# Patient Record
Sex: FEMALE | Race: WHITE | NOT HISPANIC OR LATINO | Employment: FULL TIME | URBAN - METROPOLITAN AREA
[De-identification: names, ages, dates, MRNs, and addresses within clinical notes are randomized per-mention and may not be internally consistent; named-entity substitution may affect disease eponyms.]

---

## 2017-04-05 RX ORDER — FOLIC ACID 1 MG/1
1 TABLET ORAL DAILY
COMMUNITY
End: 2021-12-14

## 2017-04-05 RX ORDER — BUSPIRONE HYDROCHLORIDE 15 MG/1
15 TABLET ORAL 3 TIMES DAILY
COMMUNITY
End: 2021-12-14

## 2017-04-05 RX ORDER — OMEPRAZOLE 20 MG/1
20 CAPSULE, DELAYED RELEASE ORAL DAILY
COMMUNITY
End: 2021-12-14

## 2017-04-05 RX ORDER — VILAZODONE HYDROCHLORIDE 20 MG/1
40 TABLET ORAL
COMMUNITY
End: 2021-12-14

## 2017-04-05 RX ORDER — DIPHENOXYLATE HYDROCHLORIDE AND ATROPINE SULFATE 2.5; .025 MG/1; MG/1
1 TABLET ORAL DAILY
COMMUNITY
End: 2021-12-14

## 2017-04-10 ENCOUNTER — ANESTHESIA (OUTPATIENT)
Dept: PERIOP | Facility: HOSPITAL | Age: 42
End: 2017-04-10
Payer: COMMERCIAL

## 2017-04-10 ENCOUNTER — HOSPITAL ENCOUNTER (OUTPATIENT)
Facility: HOSPITAL | Age: 42
Setting detail: OUTPATIENT SURGERY
Discharge: HOME | End: 2017-04-10
Attending: OTOLARYNGOLOGY | Admitting: OTOLARYNGOLOGY
Payer: COMMERCIAL

## 2017-04-10 ENCOUNTER — ANESTHESIA EVENT (OUTPATIENT)
Dept: PERIOP | Facility: HOSPITAL | Age: 42
End: 2017-04-10
Payer: COMMERCIAL

## 2017-04-10 VITALS
SYSTOLIC BLOOD PRESSURE: 137 MMHG | TEMPERATURE: 97.3 F | DIASTOLIC BLOOD PRESSURE: 68 MMHG | RESPIRATION RATE: 18 BRPM | WEIGHT: 180 LBS | HEIGHT: 64 IN | HEART RATE: 76 BPM | OXYGEN SATURATION: 99 % | BODY MASS INDEX: 30.73 KG/M2

## 2017-04-10 LAB — EXT PREGNANCY TEST URINE: NEGATIVE

## 2017-04-10 PROCEDURE — 81025 URINE PREGNANCY TEST: CPT | Performed by: ANESTHESIOLOGY

## 2017-04-10 RX ORDER — PROPOFOL 10 MG/ML
INJECTION, EMULSION INTRAVENOUS AS NEEDED
Status: DISCONTINUED | OUTPATIENT
Start: 2017-04-10 | End: 2017-04-10 | Stop reason: SURG

## 2017-04-10 RX ORDER — LIDOCAINE HYDROCHLORIDE 10 MG/ML
INJECTION, SOLUTION INFILTRATION; PERINEURAL AS NEEDED
Status: DISCONTINUED | OUTPATIENT
Start: 2017-04-10 | End: 2017-04-10 | Stop reason: SURG

## 2017-04-10 RX ORDER — MIDAZOLAM HYDROCHLORIDE 1 MG/ML
INJECTION INTRAMUSCULAR; INTRAVENOUS AS NEEDED
Status: DISCONTINUED | OUTPATIENT
Start: 2017-04-10 | End: 2017-04-10 | Stop reason: SURG

## 2017-04-10 RX ORDER — GLYCOPYRROLATE 0.2 MG/ML
INJECTION INTRAMUSCULAR; INTRAVENOUS AS NEEDED
Status: DISCONTINUED | OUTPATIENT
Start: 2017-04-10 | End: 2017-04-10 | Stop reason: SURG

## 2017-04-10 RX ORDER — FENTANYL CITRATE 50 UG/ML
INJECTION, SOLUTION INTRAMUSCULAR; INTRAVENOUS AS NEEDED
Status: DISCONTINUED | OUTPATIENT
Start: 2017-04-10 | End: 2017-04-10 | Stop reason: SURG

## 2017-04-10 RX ORDER — DIPHENHYDRAMINE HYDROCHLORIDE 50 MG/ML
INJECTION INTRAMUSCULAR; INTRAVENOUS AS NEEDED
Status: DISCONTINUED | OUTPATIENT
Start: 2017-04-10 | End: 2017-04-10 | Stop reason: SURG

## 2017-04-10 RX ORDER — DEXAMETHASONE SODIUM PHOSPHATE 10 MG/ML
INJECTION, SOLUTION INTRAMUSCULAR; INTRAVENOUS AS NEEDED
Status: DISCONTINUED | OUTPATIENT
Start: 2017-04-10 | End: 2017-04-10 | Stop reason: HOSPADM

## 2017-04-10 RX ORDER — FENTANYL CITRATE/PF 50 MCG/ML
25 SYRINGE (ML) INJECTION
Status: DISCONTINUED | OUTPATIENT
Start: 2017-04-10 | End: 2017-04-10 | Stop reason: HOSPADM

## 2017-04-10 RX ORDER — SODIUM CHLORIDE, SODIUM LACTATE, POTASSIUM CHLORIDE, CALCIUM CHLORIDE 600; 310; 30; 20 MG/100ML; MG/100ML; MG/100ML; MG/100ML
INJECTION, SOLUTION INTRAVENOUS CONTINUOUS PRN
Status: DISCONTINUED | OUTPATIENT
Start: 2017-04-10 | End: 2017-04-10 | Stop reason: SURG

## 2017-04-10 RX ORDER — ONDANSETRON 2 MG/ML
4 INJECTION INTRAMUSCULAR; INTRAVENOUS ONCE
Status: DISCONTINUED | OUTPATIENT
Start: 2017-04-10 | End: 2017-04-10 | Stop reason: HOSPADM

## 2017-04-10 RX ORDER — SUCCINYLCHOLINE CHLORIDE 20 MG/ML
INJECTION INTRAMUSCULAR; INTRAVENOUS AS NEEDED
Status: DISCONTINUED | OUTPATIENT
Start: 2017-04-10 | End: 2017-04-10 | Stop reason: SURG

## 2017-04-10 RX ORDER — SODIUM CHLORIDE, SODIUM LACTATE, POTASSIUM CHLORIDE, CALCIUM CHLORIDE 600; 310; 30; 20 MG/100ML; MG/100ML; MG/100ML; MG/100ML
125 INJECTION, SOLUTION INTRAVENOUS CONTINUOUS
Status: CANCELLED | OUTPATIENT
Start: 2017-04-10

## 2017-04-10 RX ORDER — ROCURONIUM BROMIDE 10 MG/ML
INJECTION, SOLUTION INTRAVENOUS AS NEEDED
Status: DISCONTINUED | OUTPATIENT
Start: 2017-04-10 | End: 2017-04-10 | Stop reason: SURG

## 2017-04-10 RX ORDER — ONDANSETRON 2 MG/ML
INJECTION INTRAMUSCULAR; INTRAVENOUS AS NEEDED
Status: DISCONTINUED | OUTPATIENT
Start: 2017-04-10 | End: 2017-04-10 | Stop reason: SURG

## 2017-04-10 RX ADMIN — REMIFENTANIL HYDROCHLORIDE 0.2 MCG/KG/MIN: 1 INJECTION, POWDER, LYOPHILIZED, FOR SOLUTION INTRAVENOUS at 12:48

## 2017-04-10 RX ADMIN — ONDANSETRON 4 MG: 2 INJECTION INTRAMUSCULAR; INTRAVENOUS at 12:43

## 2017-04-10 RX ADMIN — GLYCOPYRROLATE 0.8 MG: 0.2 INJECTION INTRAMUSCULAR; INTRAVENOUS at 13:20

## 2017-04-10 RX ADMIN — SODIUM CHLORIDE, SODIUM LACTATE, POTASSIUM CHLORIDE, AND CALCIUM CHLORIDE: .6; .31; .03; .02 INJECTION, SOLUTION INTRAVENOUS at 11:04

## 2017-04-10 RX ADMIN — PROPOFOL 200 MG: 10 INJECTION, EMULSION INTRAVENOUS at 12:47

## 2017-04-10 RX ADMIN — LIDOCAINE HYDROCHLORIDE 50 MG: 10 INJECTION, SOLUTION INFILTRATION; PERINEURAL at 12:47

## 2017-04-10 RX ADMIN — NEOSTIGMINE METHYLSULFATE 5 MG: 1 INJECTION INTRAMUSCULAR; INTRAVENOUS; SUBCUTANEOUS at 13:20

## 2017-04-10 RX ADMIN — FENTANYL CITRATE 100 MCG: 50 INJECTION INTRAMUSCULAR; INTRAVENOUS at 12:47

## 2017-04-10 RX ADMIN — MIDAZOLAM HYDROCHLORIDE 2 MG: 1 INJECTION, SOLUTION INTRAMUSCULAR; INTRAVENOUS at 12:43

## 2017-04-10 RX ADMIN — SUCCINYLCHOLINE CHLORIDE 100 MG: 20 INJECTION, SOLUTION INTRAMUSCULAR; INTRAVENOUS at 12:47

## 2017-04-10 RX ADMIN — DIPHENHYDRAMINE HYDROCHLORIDE 12.5 MG: 50 INJECTION, SOLUTION INTRAMUSCULAR; INTRAVENOUS at 12:44

## 2017-04-10 RX ADMIN — ROCURONIUM BROMIDE 20 MG: 10 INJECTION, SOLUTION INTRAVENOUS at 12:58

## 2018-10-01 PROBLEM — Z00.00 ENCOUNTER FOR PREVENTIVE HEALTH EXAMINATION: Status: ACTIVE | Noted: 2018-10-01

## 2018-10-04 ENCOUNTER — OUTPATIENT (OUTPATIENT)
Dept: OUTPATIENT SERVICES | Facility: HOSPITAL | Age: 43
LOS: 1 days | End: 2018-10-04
Payer: COMMERCIAL

## 2018-10-04 ENCOUNTER — APPOINTMENT (OUTPATIENT)
Dept: RADIOLOGY | Facility: HOSPITAL | Age: 43
End: 2018-10-04
Payer: COMMERCIAL

## 2018-10-04 PROCEDURE — 74740 X-RAY FEMALE GENITAL TRACT: CPT

## 2018-10-04 PROCEDURE — 58340 CATHETER FOR HYSTEROGRAPHY: CPT

## 2018-10-04 PROCEDURE — 74740 X-RAY FEMALE GENITAL TRACT: CPT | Mod: 26

## 2018-10-06 ENCOUNTER — OUTPATIENT (OUTPATIENT)
Dept: OUTPATIENT SERVICES | Facility: HOSPITAL | Age: 43
LOS: 1 days | End: 2018-10-06
Payer: COMMERCIAL

## 2018-10-06 ENCOUNTER — APPOINTMENT (OUTPATIENT)
Dept: ULTRASOUND IMAGING | Facility: HOSPITAL | Age: 43
End: 2018-10-06

## 2018-10-06 PROCEDURE — 76830 TRANSVAGINAL US NON-OB: CPT

## 2018-10-06 PROCEDURE — 76830 TRANSVAGINAL US NON-OB: CPT | Mod: 26

## 2018-10-07 ENCOUNTER — OUTPATIENT (OUTPATIENT)
Dept: OUTPATIENT SERVICES | Facility: HOSPITAL | Age: 43
LOS: 1 days | End: 2018-10-07
Payer: COMMERCIAL

## 2018-10-07 PROCEDURE — 76830 TRANSVAGINAL US NON-OB: CPT | Mod: 26

## 2018-10-07 PROCEDURE — 76830 TRANSVAGINAL US NON-OB: CPT

## 2019-05-25 ENCOUNTER — APPOINTMENT (OUTPATIENT)
Dept: LAB | Facility: HOSPITAL | Age: 44
End: 2019-05-25
Payer: COMMERCIAL

## 2019-05-25 ENCOUNTER — TRANSCRIBE ORDERS (OUTPATIENT)
Dept: ADMINISTRATIVE | Facility: HOSPITAL | Age: 44
End: 2019-05-25

## 2019-05-25 DIAGNOSIS — N88.3 FEMALE INFERTILITY DUE TO STRUCTURAL CONGENITAL ANOMALY OF CERVIX: ICD-10-CM

## 2019-05-25 DIAGNOSIS — N97.8 FEMALE INFERTILITY DUE TO STRUCTURAL CONGENITAL ANOMALY OF CERVIX: Primary | ICD-10-CM

## 2019-05-25 DIAGNOSIS — Z31.41 FERTILITY TESTING: ICD-10-CM

## 2019-05-25 DIAGNOSIS — N88.3 FEMALE INFERTILITY DUE TO STRUCTURAL CONGENITAL ANOMALY OF CERVIX: Primary | ICD-10-CM

## 2019-05-25 DIAGNOSIS — N97.8 FEMALE INFERTILITY DUE TO STRUCTURAL CONGENITAL ANOMALY OF CERVIX: ICD-10-CM

## 2019-05-25 LAB — PROGEST SERPL-MCNC: 6.8 NG/ML

## 2019-05-25 PROCEDURE — 36415 COLL VENOUS BLD VENIPUNCTURE: CPT

## 2019-05-25 PROCEDURE — 84144 ASSAY OF PROGESTERONE: CPT

## 2019-06-20 ENCOUNTER — TRANSCRIBE ORDERS (OUTPATIENT)
Dept: ADMINISTRATIVE | Facility: HOSPITAL | Age: 44
End: 2019-06-20

## 2019-06-20 ENCOUNTER — APPOINTMENT (OUTPATIENT)
Dept: LAB | Facility: HOSPITAL | Age: 44
End: 2019-06-20
Payer: COMMERCIAL

## 2019-06-20 DIAGNOSIS — N97.9 FEMALE INFERTILITY: Primary | ICD-10-CM

## 2019-06-20 DIAGNOSIS — N97.9 FEMALE INFERTILITY: ICD-10-CM

## 2019-06-20 LAB — PROGEST SERPL-MCNC: 5.1 NG/ML

## 2019-06-20 PROCEDURE — 84144 ASSAY OF PROGESTERONE: CPT

## 2019-06-20 PROCEDURE — 36415 COLL VENOUS BLD VENIPUNCTURE: CPT

## 2021-12-14 ENCOUNTER — INITIAL PRENATAL (OUTPATIENT)
Dept: OBGYN CLINIC | Facility: CLINIC | Age: 46
End: 2021-12-14
Payer: COMMERCIAL

## 2021-12-14 VITALS
BODY MASS INDEX: 31.58 KG/M2 | WEIGHT: 185 LBS | SYSTOLIC BLOOD PRESSURE: 120 MMHG | DIASTOLIC BLOOD PRESSURE: 82 MMHG | HEIGHT: 64 IN

## 2021-12-14 DIAGNOSIS — Z34.01 ENCOUNTER FOR SUPERVISION OF NORMAL FIRST PREGNANCY IN FIRST TRIMESTER: Primary | ICD-10-CM

## 2021-12-14 DIAGNOSIS — E03.8 SUBCLINICAL HYPOTHYROIDISM: ICD-10-CM

## 2021-12-14 DIAGNOSIS — Z23 NEED FOR INFLUENZA VACCINATION: ICD-10-CM

## 2021-12-14 PROBLEM — I10 ESSENTIAL HYPERTENSION: Status: ACTIVE | Noted: 2021-08-27

## 2021-12-14 PROBLEM — Z31.69 PROCREATIVE MANAGEMENT COUNSELING: Status: ACTIVE | Noted: 2021-08-26

## 2021-12-14 PROCEDURE — PNV: Performed by: PHYSICIAN ASSISTANT

## 2021-12-14 PROCEDURE — G0476 HPV COMBO ASSAY CA SCREEN: HCPCS | Performed by: PHYSICIAN ASSISTANT

## 2021-12-14 PROCEDURE — 90471 IMMUNIZATION ADMIN: CPT

## 2021-12-14 PROCEDURE — G0145 SCR C/V CYTO,THINLAYER,RESCR: HCPCS | Performed by: PHYSICIAN ASSISTANT

## 2021-12-14 PROCEDURE — 90686 IIV4 VACC NO PRSV 0.5 ML IM: CPT

## 2021-12-14 RX ORDER — CHORIOGONADOTROPIN ALFA 250 UG/.5ML
250 INJECTION, SOLUTION SUBCUTANEOUS
COMMUNITY
End: 2021-12-14

## 2021-12-14 RX ORDER — LEVOTHYROXINE SODIUM 0.03 MG/1
TABLET ORAL
COMMUNITY
Start: 2021-11-30 | End: 2022-02-17 | Stop reason: SDUPTHER

## 2021-12-14 RX ORDER — VALACYCLOVIR HYDROCHLORIDE 500 MG/1
TABLET, FILM COATED ORAL
Status: ON HOLD | COMMUNITY
Start: 2021-11-13 | End: 2022-03-31 | Stop reason: ALTCHOICE

## 2021-12-16 LAB
HPV HR 12 DNA CVX QL NAA+PROBE: NEGATIVE
HPV16 DNA CVX QL NAA+PROBE: NEGATIVE
HPV18 DNA CVX QL NAA+PROBE: NEGATIVE

## 2021-12-21 LAB
LAB AP GYN PRIMARY INTERPRETATION: NORMAL
LAB AP LMP: NORMAL
Lab: NORMAL

## 2021-12-23 ENCOUNTER — TELEPHONE (OUTPATIENT)
Dept: OBGYN CLINIC | Facility: CLINIC | Age: 46
End: 2021-12-23

## 2021-12-28 LAB — GLUCOSE 1H P 50 G GLC PO SERPL-MCNC: 83 MG/DL

## 2021-12-29 ENCOUNTER — TELEPHONE (OUTPATIENT)
Dept: OTHER | Facility: OTHER | Age: 46
End: 2021-12-29

## 2021-12-29 LAB
ABO GROUP BLD: NORMAL
ALBUMIN SERPL BCP-MCNC: 3.9 G/DL (ref 3.5–5)
ALP SERPL-CCNC: 65 U/L (ref 46–116)
ALT SERPL W P-5'-P-CCNC: 31 U/L (ref 12–78)
ANION GAP SERPL CALCULATED.3IONS-SCNC: 11 MMOL/L (ref 4–13)
AST SERPL W P-5'-P-CCNC: 22 U/L (ref 5–45)
B-HCG SERPL-ACNC: ABNORMAL MIU/ML
BASOPHILS # BLD AUTO: 0.05 THOUSANDS/ΜL (ref 0–0.1)
BASOPHILS NFR BLD AUTO: 1 % (ref 0–1)
BILIRUB SERPL-MCNC: 0.28 MG/DL (ref 0.2–1)
BLD GP AB SCN SERPL QL: NEGATIVE
BUN SERPL-MCNC: 11 MG/DL (ref 5–25)
CALCIUM SERPL-MCNC: 10.2 MG/DL (ref 8.3–10.1)
CHLORIDE SERPL-SCNC: 101 MMOL/L (ref 100–108)
CO2 SERPL-SCNC: 24 MMOL/L (ref 21–32)
CREAT SERPL-MCNC: 0.89 MG/DL (ref 0.6–1.3)
EOSINOPHIL # BLD AUTO: 0.09 THOUSAND/ΜL (ref 0–0.61)
EOSINOPHIL NFR BLD AUTO: 1 % (ref 0–6)
ERYTHROCYTE [DISTWIDTH] IN BLOOD BY AUTOMATED COUNT: 13.2 % (ref 11.6–15.1)
GFR SERPL CREATININE-BSD FRML MDRD: 79 ML/MIN/1.73SQ M
GLUCOSE SERPL-MCNC: 94 MG/DL (ref 65–140)
HCT VFR BLD AUTO: 42.5 % (ref 34.8–46.1)
HGB BLD-MCNC: 14.9 G/DL (ref 11.5–15.4)
IMM GRANULOCYTES # BLD AUTO: 0.04 THOUSAND/UL (ref 0–0.2)
IMM GRANULOCYTES NFR BLD AUTO: 0 % (ref 0–2)
LYMPHOCYTES # BLD AUTO: 2.86 THOUSANDS/ΜL (ref 0.6–4.47)
LYMPHOCYTES NFR BLD AUTO: 28 % (ref 14–44)
MCH RBC QN AUTO: 29.7 PG (ref 26.8–34.3)
MCHC RBC AUTO-ENTMCNC: 35.1 G/DL (ref 31.4–37.4)
MCV RBC AUTO: 85 FL (ref 82–98)
MONOCYTES # BLD AUTO: 1.03 THOUSAND/ΜL (ref 0.17–1.22)
MONOCYTES NFR BLD AUTO: 10 % (ref 4–12)
NEUTROPHILS # BLD AUTO: 6.02 THOUSANDS/ΜL (ref 1.85–7.62)
NEUTS SEG NFR BLD AUTO: 60 % (ref 43–75)
NRBC BLD AUTO-RTO: 0 /100 WBCS
PLATELET # BLD AUTO: 225 THOUSANDS/UL (ref 149–390)
PMV BLD AUTO: 10 FL (ref 8.9–12.7)
POTASSIUM SERPL-SCNC: 3.6 MMOL/L (ref 3.5–5.3)
PROT SERPL-MCNC: 7.5 G/DL (ref 6.4–8.2)
RBC # BLD AUTO: 5.02 MILLION/UL (ref 3.81–5.12)
RH BLD: POSITIVE
SODIUM SERPL-SCNC: 136 MMOL/L (ref 136–145)
SPECIMEN EXPIRATION DATE: NORMAL
WBC # BLD AUTO: 10.09 THOUSAND/UL (ref 4.31–10.16)

## 2021-12-29 PROCEDURE — 99284 EMERGENCY DEPT VISIT MOD MDM: CPT | Performed by: EMERGENCY MEDICINE

## 2021-12-29 PROCEDURE — 80053 COMPREHEN METABOLIC PANEL: CPT | Performed by: EMERGENCY MEDICINE

## 2021-12-29 PROCEDURE — 85025 COMPLETE CBC W/AUTO DIFF WBC: CPT | Performed by: EMERGENCY MEDICINE

## 2021-12-29 PROCEDURE — 86850 RBC ANTIBODY SCREEN: CPT | Performed by: EMERGENCY MEDICINE

## 2021-12-29 PROCEDURE — 86900 BLOOD TYPING SEROLOGIC ABO: CPT | Performed by: EMERGENCY MEDICINE

## 2021-12-29 PROCEDURE — 99284 EMERGENCY DEPT VISIT MOD MDM: CPT

## 2021-12-29 PROCEDURE — 86901 BLOOD TYPING SEROLOGIC RH(D): CPT | Performed by: EMERGENCY MEDICINE

## 2021-12-29 PROCEDURE — 36415 COLL VENOUS BLD VENIPUNCTURE: CPT | Performed by: EMERGENCY MEDICINE

## 2021-12-29 PROCEDURE — 84702 CHORIONIC GONADOTROPIN TEST: CPT | Performed by: EMERGENCY MEDICINE

## 2021-12-29 RX ORDER — ONDANSETRON 4 MG/1
4 TABLET, ORALLY DISINTEGRATING ORAL ONCE
Status: COMPLETED | OUTPATIENT
Start: 2021-12-29 | End: 2021-12-29

## 2021-12-29 RX ADMIN — ONDANSETRON 4 MG: 4 TABLET, ORALLY DISINTEGRATING ORAL at 19:07

## 2021-12-29 NOTE — ED PROVIDER NOTES
History  No chief complaint on file  HPI     42year old female presenting to the ED with vaginal bleeding since 30 minutes ago  Patient was walking around a store, felt a gush, noted that there was a lot of bleeding  Had spotting last Thursday  Lasted one day then self resolved  C/o dizziness, hand numbness, nausea  Reports an episode of heartburn earlier this morning which has since resolved  (-) abdominal pain, no history of easy bleeding  No blood thinners, no antiplatelet medications  PMH HTN, GERD  Cannot display prior to admission medications because the patient has not been admitted in this contact  Past Medical History:   Diagnosis Date    Abnormal Pap smear of cervix     in 30's   Anxiety     Environmental allergies     GERD (gastroesophageal reflux disease)     Hypertension     PONV (postoperative nausea and vomiting)     Sinus infection     chronic       Past Surgical History:   Procedure Laterality Date    HAND SURGERY Left     ganglion cyst    [place] LARYNGOSCOPY,DIRECT,SCOPE,INJ CORDS N/A 4/10/2017    Procedure: MICROLARYNGOSCOPY BILATERAL VOCAL FOLD INJECTION (DECADRON); Surgeon: Angella Kirk MD;  [facility];  Service: ENT    REFRACTIVE SURGERY  2010    WISDOM TOOTH EXTRACTION         Family History   Problem Relation Age of Onset   Sabina Mare Breast cancer Mother 48    Hypertension Father     Diabetes type I Brother     Diabetes Maternal Grandmother     Diabetes Paternal Grandfather      I have reviewed and agree with the history as documented  E-Cigarette/Vaping    E-Cigarette Use Never User      E-Cigarette/Vaping Substances    Nicotine No     THC No     CBD No     Flavoring No     Other No     Unknown No      Social History     Tobacco Use    Smoking status: Never Smoker    Smokeless tobacco: Never Used   Vaping Use    Vaping Use: Never used   Substance Use Topics    Alcohol use: Not Currently     Comment: socially 1-2 drinks/wk    Drug use:  No Review of Systems   Constitutional: Negative for chills and fever  Respiratory: Negative for apnea, cough, choking, chest tightness, shortness of breath, wheezing and stridor  Cardiovascular: Positive for chest pain (hearburn)  Negative for palpitations and leg swelling  Gastrointestinal: Positive for nausea  Negative for abdominal distention, abdominal pain, constipation, diarrhea, rectal pain and vomiting  Genitourinary: Positive for vaginal bleeding  Negative for dysuria, hematuria and vaginal pain  Musculoskeletal: Negative for back pain, gait problem and neck pain  Skin: Negative for color change, pallor, rash and wound  Neurological: Negative for dizziness, tremors, seizures, syncope, facial asymmetry, speech difficulty, weakness, light-headedness, numbness and headaches  Physical Exam  Physical Exam  Vitals and nursing note reviewed  Constitutional:       General: She is in acute distress  Appearance: She is well-developed  She is not ill-appearing, toxic-appearing or diaphoretic  HENT:      Head: Normocephalic and atraumatic  Right Ear: External ear normal       Left Ear: External ear normal       Nose: Nose normal    Eyes:      General:         Right eye: No discharge  Left eye: No discharge  Extraocular Movements: Extraocular movements intact  Conjunctiva/sclera: Conjunctivae normal       Pupils: Pupils are equal, round, and reactive to light  Cardiovascular:      Rate and Rhythm: Normal rate and regular rhythm  Heart sounds: Normal heart sounds  No murmur heard  No friction rub  No gallop  Pulmonary:      Effort: Pulmonary effort is normal  No respiratory distress  Breath sounds: Normal breath sounds  No stridor  No wheezing, rhonchi or rales  Chest:      Chest wall: No tenderness  Abdominal:      General: Bowel sounds are normal  There is no distension  Palpations: Abdomen is soft  There is no mass  Tenderness:  There is no abdominal tenderness  There is no guarding or rebound  Hernia: No hernia is present  Genitourinary:     Comments: Deferred -- am in triage  Musculoskeletal:         General: No tenderness or deformity  Normal range of motion  Cervical back: Normal range of motion and neck supple  Skin:     General: Skin is warm and dry  Capillary Refill: Capillary refill takes less than 2 seconds  Neurological:      Mental Status: She is alert and oriented to person, place, and time  Psychiatric:         Mood and Affect: Mood is anxious  Affect is tearful  Vital Signs  ED Triage Vitals   Temp Pulse Resp BP SpO2   -- -- -- -- --      Temp src Heart Rate Source Patient Position - Orthostatic VS BP Location FiO2 (%)   -- -- -- -- --      Pain Score       --           There were no vitals filed for this visit  Visual Acuity      ED Medications  Medications - No data to display    Diagnostic Studies  Results Reviewed     Procedure Component Value Units Date/Time    CBC and differential [[id number]]     Lab Status: No result Specimen: Blood     Comprehensive metabolic panel [[id number]]     Lab Status: No result Specimen: Blood     hCG, quantitative [[id number]]     Lab Status: No result Specimen: Blood                  No orders to display              Procedures  Procedures         ED Course                                             MDM    Disposition  Final diagnoses:   None     ED Disposition     None      Follow-up Information    None         Patient's Medications   Discharge Prescriptions    No medications on file       No discharge procedures on file      PDMP Review     None          ED Provider  Electronically Signed by           Raiza Roth MD  12/29/21 4862

## 2021-12-30 ENCOUNTER — ULTRASOUND (OUTPATIENT)
Dept: PERINATAL CARE | Facility: OTHER | Age: 46
End: 2021-12-30
Payer: COMMERCIAL

## 2021-12-30 ENCOUNTER — HOSPITAL ENCOUNTER (EMERGENCY)
Facility: HOSPITAL | Age: 46
Discharge: HOME | End: 2021-12-30
Attending: EMERGENCY MEDICINE | Admitting: EMERGENCY MEDICINE
Payer: COMMERCIAL

## 2021-12-30 VITALS
HEART RATE: 101 BPM | SYSTOLIC BLOOD PRESSURE: 106 MMHG | WEIGHT: 179.4 LBS | HEIGHT: 64 IN | DIASTOLIC BLOOD PRESSURE: 77 MMHG | BODY MASS INDEX: 30.63 KG/M2

## 2021-12-30 VITALS
SYSTOLIC BLOOD PRESSURE: 134 MMHG | RESPIRATION RATE: 18 BRPM | TEMPERATURE: 98 F | DIASTOLIC BLOOD PRESSURE: 70 MMHG | HEART RATE: 100 BPM | OXYGEN SATURATION: 100 %

## 2021-12-30 DIAGNOSIS — O41.8X10 SUBCHORIONIC HEMATOMA IN FIRST TRIMESTER, SINGLE OR UNSPECIFIED FETUS: ICD-10-CM

## 2021-12-30 DIAGNOSIS — Z3A.12 12 WEEKS GESTATION OF PREGNANCY: ICD-10-CM

## 2021-12-30 DIAGNOSIS — Z36.82 ENCOUNTER FOR (NT) NUCHAL TRANSLUCENCY SCAN: Primary | ICD-10-CM

## 2021-12-30 DIAGNOSIS — O46.8X1 SUBCHORIONIC HEMATOMA IN FIRST TRIMESTER, SINGLE OR UNSPECIFIED FETUS: ICD-10-CM

## 2021-12-30 DIAGNOSIS — O46.90 VAGINAL BLEEDING IN PREGNANCY: ICD-10-CM

## 2021-12-30 DIAGNOSIS — O20.0 THREATENED MISCARRIAGE: ICD-10-CM

## 2021-12-30 DIAGNOSIS — O46.90 VAGINAL BLEEDING DURING PREGNANCY: Primary | ICD-10-CM

## 2021-12-30 PROBLEM — O10.919 CHRONIC HYPERTENSION AFFECTING PREGNANCY: Status: ACTIVE | Noted: 2021-08-27

## 2021-12-30 PROBLEM — O98.511 COVID-19 AFFECTING PREGNANCY IN FIRST TRIMESTER: Status: ACTIVE | Noted: 2021-12-30

## 2021-12-30 PROBLEM — E03.9 HYPOTHYROIDISM AFFECTING PREGNANCY: Status: ACTIVE | Noted: 2021-12-30

## 2021-12-30 PROBLEM — O99.280 HYPOTHYROIDISM AFFECTING PREGNANCY: Status: ACTIVE | Noted: 2021-12-30

## 2021-12-30 PROBLEM — U07.1 COVID-19 AFFECTING PREGNANCY IN FIRST TRIMESTER: Status: ACTIVE | Noted: 2021-12-30

## 2021-12-30 PROCEDURE — 76813 OB US NUCHAL MEAS 1 GEST: CPT | Performed by: OBSTETRICS & GYNECOLOGY

## 2021-12-30 PROCEDURE — 76801 OB US < 14 WKS SINGLE FETUS: CPT | Performed by: OBSTETRICS & GYNECOLOGY

## 2021-12-30 PROCEDURE — 99242 OFF/OP CONSLTJ NEW/EST SF 20: CPT | Performed by: OBSTETRICS & GYNECOLOGY

## 2021-12-30 PROCEDURE — NC001 PR NO CHARGE: Performed by: STUDENT IN AN ORGANIZED HEALTH CARE EDUCATION/TRAINING PROGRAM

## 2021-12-30 PROCEDURE — NC001 PR NO CHARGE: Performed by: EMERGENCY MEDICINE

## 2021-12-30 NOTE — CONSULTS
Consultation - Gynecology  Lily Reddy 42 y o  female MRN: [id number]  Unit/Bed#: ED 11 Encounter: [id number]      Consults      Chief Complaint   Patient presents with    Vaginal Bleeding - Pregnant     PT is 12 weeks pregnant and was walking around [address] when she started to bleed  PT denies pain  History of Present Illness   Physician Requesting Consult: Micki Del Rio MD  Reason for Consult / Principal Problem Vaginal bleeding in pregnancy  Subspeciality: ObGyn  HPI: Brain Aldrich is a 42 y o   who presents with worsening vaginal bleeding this evening  Patient states that last Wednesday, she noticed some brown colored discharge that ultimately subsided  This evening she was walking around a store when she felt a sudden gush of fluid  She immediately went to the restroom and noticed blood pouring out of her vagina with small clots  She denies any recent abdominal trauma, falls, recent sexual intercourse, or anything in the vagina since [place] day  She is not feeling any cramping or pelvic pain  Of note, this pregnancy was achieved with three cycles of IUI via her reproductive endocrinologist        Review of Systems  See above    Historical Information   Past Medical History:   Diagnosis Date    Abnormal Pap smear of cervix     in    Anxiety     Environmental allergies     GERD (gastroesophageal reflux disease)     Hypertension     PONV (postoperative nausea and vomiting)     Sinus infection     chronic     Past Surgical History:   Procedure Laterality Date    HAND SURGERY Left     ganglion cyst    [place] LARYNGOSCOPY,DIRECT,SCOPE,INJ CORDS N/A 4/10/2017    Procedure: MICROLARYNGOSCOPY BILATERAL VOCAL FOLD INJECTION (DECADRON);   Surgeon: Luanne Martinez MD;  [facility];  Service: ENT    REFRACTIVE SURGERY      WISDOM TOOTH EXTRACTION       OB History    Para Term  AB Living   1 0 0 0 0 0   SAB IAB Ectopic Multiple Live Births 0 0 0 0 0      # Outcome Date GA Lbr Marito/2nd Weight Sex Delivery Anes PTL Lv   1 Current              Family History   Problem Relation Age of Onset   Neto Polio Breast cancer Mother 48    Hypertension Father     Diabetes type I Brother     Diabetes Maternal Grandmother     Diabetes Paternal Grandfather      Social History   Social History     Substance and Sexual Activity   Alcohol Use Not Currently    Comment: socially 1-2 drinks/wk     Social History     Substance and Sexual Activity   Drug Use No     Social History     Tobacco Use   Smoking Status Never Smoker   Smokeless Tobacco Never Used       Meds/Allergies   No current facility-administered medications for this encounter  Allergies   Allergen Reactions    Other      Seasonal         Objective   Vitals: Blood pressure 134/70, pulse 100, temperature 98 °F (36 7 °C), temperature source Oral, resp  rate 18, last menstrual period 10/07/2021, SpO2 100 %  There is no height or weight on file to calculate BMI      No intake or output data in the 24 hours ending 12/30/21 0336    Invasive Devices  Report    None                 Physical Exam  Pelvic: speculum exam revealed a closed cervical os with no active bleeding noted, but evidence of old blood below the cervix    Lab Results:   Recent Results (from the past 24 hour(s))   CBC and differential    Collection Time: 12/29/21  7:05 PM   Result Value Ref Range    WBC 10 09 4 31 - 10 16 Thousand/uL    RBC 5 02 3 81 - 5 12 Million/uL    Hemoglobin 14 9 11 5 - 15 4 g/dL    Hematocrit 42 5 34 8 - 46 1 %    MCV 85 82 - 98 fL    MCH 29 7 26 8 - 34 3 pg    MCHC 35 1 31 4 - 37 4 g/dL    RDW 13 2 11 6 - 15 1 %    MPV 10 0 8 9 - 12 7 fL    Platelets 632 722 - 018 Thousands/uL    nRBC 0 /100 WBCs    Neutrophils Relative 60 43 - 75 %    Immat GRANS % 0 0 - 2 %    Lymphocytes Relative 28 14 - 44 %    Monocytes Relative 10 4 - 12 %    Eosinophils Relative 1 0 - 6 %    Basophils Relative 1 0 - 1 %    Neutrophils Absolute 6 02 1 85 - 7 62 Thousands/µL    Immature Grans Absolute 0 04 0 00 - 0 20 Thousand/uL    Lymphocytes Absolute 2 86 0 60 - 4 47 Thousands/µL    Monocytes Absolute 1 03 0 17 - 1 22 Thousand/µL    Eosinophils Absolute 0 09 0 00 - 0 61 Thousand/µL    Basophils Absolute 0 05 0 00 - 0 10 Thousands/µL   Comprehensive metabolic panel    Collection Time: 21  7:05 PM   Result Value Ref Range    Sodium 136 136 - 145 mmol/L    Potassium 3 6 3 5 - 5 3 mmol/L    Chloride 101 100 - 108 mmol/L    CO2 24 21 - 32 mmol/L    ANION GAP 11 4 - 13 mmol/L    BUN 11 5 - 25 mg/dL    Creatinine 0 89 0 60 - 1 30 mg/dL    Glucose 94 65 - 140 mg/dL    Calcium 10 2 (H) 8 3 - 10 1 mg/dL    AST 22 5 - 45 U/L    ALT 31 12 - 78 U/L    Alkaline Phosphatase 65 46 - 116 U/L    Total Protein 7 5 6 4 - 8 2 g/dL    Albumin 3 9 3 5 - 5 0 g/dL    Total Bilirubin 0 28 0 20 - 1 00 mg/dL    eGFR 79 ml/min/1 73sq m   hCG, quantitative    Collection Time: 21  7:05 PM   Result Value Ref Range    HCG, Quant 25,462 (H) <=6 mIU/mL   Type and screen    Collection Time: 21  7:05 PM   Result Value Ref Range    ABO Grouping A     Rh Factor Positive     Antibody Screen Negative     Specimen Expiration Date 2022          Assessment/Plan     Assessment: This is a 41 yo  at [address] [place] [address] who presents with vaginal bleeding and threatened miscarriage  Cervix closed on speculum exam  Bedside TAUS showed an active fetus with FHR of 162 BPM  There was no obvious sign of subchorionic hematoma, however possible placenta previa versus low lying placenta was identified  We discussed that if she does have a small subchorionic hematoma or placental abruption that was not identified on today's ultrasound, there is still a high likelihood that this could remain stable or even resolve over time  We also discussed that placenta previa or low lying placentas in early pregnancy also often resolve as the uterus grows and pregnancy progresses   She understands that if this were to have a diagnosis of placenta previa, she would have to have strict pelvic rest until resolution or delivery via  if it does not resolve  Plan:  - Strict precautions given to call her ObGyn's office and/or present to the ED if she develops heavier vaginal bleeding, passage of larger clots, or severe pelvic/abdominal pain or cramping  - She has an appt scheduled with her ObGyn and MFM for first trimester ultrasound on 1/3 and , respectively  - Rh positive status, RhoGAM not indicated  - Strict pelvic rest until her formal [facility] ultrasound       D/w Dr Fitzpatrick Dk / Coordination of Care  Total floor / unit time spent today was 30 minutes  Greater than 50% of total time was spent with the patient and / or family counseling and / or coordination of care       Haim Fragoso MD  2021  3:36 AM

## 2021-12-30 NOTE — ED ATTENDING ATTESTATION
2021  I, Naveed Briones MD, saw and evaluated the patient  I have discussed the patient with the resident/non-physician practitioner and agree with the resident's/non-physician practitioner's findings, Plan of Care, and MDM as documented in the resident's/non-physician practitioner's note, except where noted  All available labs and Radiology studies were reviewed  I was present for key portions of any procedure(s) performed by the resident/non-physician practitioner and I was immediately available to provide assistance  At this point I agree with the current assessment done in the Emergency Department  I have conducted an independent evaluation of this patient a history and physical is as follows: Patient is a 42year old female  with vaginal bleeding tonight spontaneously while at [facility]  No abdominal pain  No N/V  No urinary sx  HELGA 22  Patient is 12 weeks and 3 days gestation  Has had prior US with IUP  Is due to meet Dr Samantha Cruz this upcoming week  No recent old records from this ED seen on computer system  [facility] patient and last Rx filled was on 10/13/21 for ovidrel for 14 day supply  Mask in place  Tachycardia without murmur  Lungs clear  Abdomen soft and nontender  Good bowel sounds  Anxious  No pallor  No rash  DDx including but not limited to: doubt ectopic pregnancy; threatened , missed , incomplete , anemia, doubt coagulopathy, DUB, tumor, retained products of conception, PCOS; doubt ovarian torsion or ruptured ovarian cyst  I reviewed labs       ED Course         Critical Care Time  Procedures

## 2021-12-30 NOTE — DISCHARGE INSTRUCTIONS
Go to and/or schedule an appointment with your OBGYN for follow-up of this bleeding  Return to the emergency department should you develop severe bleeding, severe abdominal pain, fevers or chills, or any other concerning symptoms

## 2021-12-30 NOTE — ED PROVIDER NOTES
History  Chief Complaint   Patient presents with    Vaginal Bleeding - Pregnant     PT is 12 weeks pregnant and was walking around [address] when she started to bleed  PT denies pain  Mark Alonzo is a  12 week IUI pregnant 42year old female here today for evaluation of bright red vaginal bleeding with passage of clots  Patient's bleeding began at around 6:00 p m  this past evening, initially the bleeding was heavy with clots  Since that time, the bleeding appears to have slowed, though she still is having bright red spotting  She denies any abdominal pain or cramping  Patient is very concerned and anxious that she might be having a miscarriage, this is her 1st time being pregnant  She does not have any history of any clotting disorders  She does not take any blood thinners  Her past medical history is significant for hypertension and recently diagnosed hypothyroidism  History provided by:  Patient   used: No        Prior to Admission Medications   Prescriptions Last Dose Informant Patient Reported? Taking? Ascorbic Acid (VITAMIN C PO)   Yes No   Sig: Take by mouth   Cetirizine HCl (ZYRTEC ALLERGY PO)   Yes No   Sig: Take by mouth   Pyridoxine HCl (VITAMIN B-6) 500 MG tablet   Yes No   Sig: Take 500 mg by mouth daily   VITAMIN D PO   Yes No   Sig: Take by mouth   labetalol (NORMODYNE) 200 mg tablet   Yes No   Sig: Take 200 mg by mouth every 12 (twelve) hours   levothyroxine 25 mcg tablet   Yes No   valACYclovir (VALTREX) 500 mg tablet   Yes No      Facility-Administered Medications: None       Past Medical History:   Diagnosis Date    Abnormal Pap smear of cervix     in 30's       Anxiety     Environmental allergies     GERD (gastroesophageal reflux disease)     Hypertension     PONV (postoperative nausea and vomiting)     Sinus infection     chronic       Past Surgical History:   Procedure Laterality Date    HAND SURGERY Left     ganglion cyst    SD LARYNGOSCOPY,DIRECT,SCOPE,INJ CORDS N/A 4/10/2017    Procedure: MICROLARYNGOSCOPY BILATERAL VOCAL FOLD INJECTION (DECADRON); Surgeon: Angella Kirk MD;  Location: [facility];  Service: ENT    REFRACTIVE SURGERY  2010    WISDOM TOOTH EXTRACTION         Family History   Problem Relation Age of Onset   Sabina Mare Breast cancer Mother 48    Hypertension Father     Diabetes type I Brother     Diabetes Maternal Grandmother     Diabetes Paternal Grandfather      I have reviewed and agree with the history as documented  E-Cigarette/Vaping    E-Cigarette Use Never User      E-Cigarette/Vaping Substances    Nicotine No     THC No     CBD No     Flavoring No     Other No     Unknown No      Social History     Tobacco Use    Smoking status: Never Smoker    Smokeless tobacco: Never Used   Vaping Use    Vaping Use: Never used   Substance Use Topics    Alcohol use: Not Currently     Comment: socially 1-2 drinks/wk    Drug use: No        Review of Systems   Constitutional: Negative for chills and fever  HENT: Negative for ear pain and sore throat  Eyes: Negative for pain and visual disturbance  Respiratory: Negative for cough and shortness of breath  Cardiovascular: Negative for chest pain and palpitations  Gastrointestinal: Negative for abdominal pain and vomiting  Genitourinary: Positive for vaginal bleeding  Negative for dysuria and hematuria  Musculoskeletal: Negative for arthralgias and back pain  Skin: Negative for color change and rash  Neurological: Negative for seizures and syncope  All other systems reviewed and are negative        Physical Exam  ED Triage Vitals   Temperature Pulse Respirations Blood Pressure SpO2   12/29/21 1900 12/29/21 1900 12/29/21 1900 12/29/21 1900 12/29/21 1900   98 °F (36 7 °C) (!) 107 18 139/69 100 %      Temp Source Heart Rate Source Patient Position - Orthostatic VS BP Location FiO2 (%)   12/29/21 1900 12/29/21 1900 12/29/21 1900 12/29/21 1900 --   Oral Monitor Sitting Left arm       Pain Score       12/30/21 0022       No Pain             Orthostatic Vital Signs  Vitals:    12/29/21 1900 12/30/21 0022 12/30/21 0250   BP: 139/69 128/64 134/70   Pulse: (!) 107 (!) 107 100   Patient Position - Orthostatic VS: Sitting Lying Lying       Physical Exam  Vitals and nursing note reviewed  Exam conducted with a chaperone present  Constitutional:       General: She is in acute distress  Appearance: Normal appearance  She is not ill-appearing  Comments: Patient is very anxious and in moderate emotional distress, she is concerned that she might be having a miscarriage   HENT:      Head: Normocephalic and atraumatic  Right Ear: External ear normal       Left Ear: External ear normal       Mouth/Throat:      Mouth: Mucous membranes are moist       Pharynx: Oropharynx is clear  Eyes:      General: No scleral icterus  Conjunctiva/sclera: Conjunctivae normal    Cardiovascular:      Rate and Rhythm: Regular rhythm  Tachycardia present  Heart sounds: Normal heart sounds  Pulmonary:      Effort: Pulmonary effort is normal  No respiratory distress  Breath sounds: Normal breath sounds  Abdominal:      General: Abdomen is flat  There is no distension  Tenderness: There is no abdominal tenderness  There is no guarding or rebound  Genitourinary:     General: Normal vulva  Exam position: Supine  Cervix: Cervical bleeding present  Comments: There is minimal blood in the vaginal vault  There is some blood coming from the cervical os, it does not appear to be dilated  Musculoskeletal:         General: No tenderness or signs of injury  Cervical back: Neck supple  No rigidity  Skin:     General: Skin is warm  Coloration: Skin is not jaundiced  Findings: No erythema or rash  Neurological:      General: No focal deficit present  Mental Status: She is alert  Mental status is at baseline     Psychiatric:         Mood and Affect: Mood normal          Behavior: Behavior normal          ED Medications  Medications   ondansetron (ZOFRAN-ODT) dispersible tablet 4 mg (4 mg Oral Given 12/29/21 1907)       Diagnostic Studies  Results Reviewed     Procedure Component Value Units Date/Time    hCG, quantitative [[id number]]  (Abnormal) Collected: 12/29/21 1905    Lab Status: Final result Specimen: Blood from Arm, Right Updated: 12/29/21 2011     HCG, Quant 25,462 mIU/mL     Narrative:       Expected Ranges:     Approximate               Approximate HCG  Gestation age          Concentration ( mIU/mL)  _____________          ______________________   Mike Stern                      HCG values  0 2-1                       5-50  1-2                           2-3                         100-5000  3-4                         500-15089  4-5                         1000-22552  5-6                         [id number]  6-8                         [id number]  8-12                        [id number]      Comprehensive metabolic panel [[id number]]  (Abnormal) Collected: 12/29/21 1905    Lab Status: Final result Specimen: Blood from Arm, Right Updated: 12/29/21 1944     Sodium 136 mmol/L      Potassium 3 6 mmol/L      Chloride 101 mmol/L      CO2 24 mmol/L      ANION GAP 11 mmol/L      BUN 11 mg/dL      Creatinine 0 89 mg/dL      Glucose 94 mg/dL      Calcium 10 2 mg/dL      AST 22 U/L      ALT 31 U/L      Alkaline Phosphatase 65 U/L      Total Protein 7 5 g/dL      Albumin 3 9 g/dL      Total Bilirubin 0 28 mg/dL      eGFR 79 ml/min/1 73sq m     Narrative:      [place] guidelines for Chronic Kidney Disease (CKD):     Stage 1 with normal or high GFR (GFR > 90 mL/min/1 73 square meters)    Stage 2 Mild CKD (GFR = 60-89 mL/min/1 73 square meters)    Stage 3A Moderate CKD (GFR = 45-59 mL/min/1 73 square meters)    Stage 3B Moderate CKD (GFR = 30-44 mL/min/1 73 square meters)    Stage 4 Severe CKD (GFR = 15-29 mL/min/1 73 square meters)    Stage 5 End Stage CKD (GFR <15 mL/min/1 73 square meters)  Note: GFR calculation is accurate only with a steady state creatinine    CBC and differential [[id number]] Collected: 21    Lab Status: Final result Specimen: Blood from Arm, Right Updated: 21     WBC 10 09 Thousand/uL      RBC 5 02 Million/uL      Hemoglobin 14 9 g/dL      Hematocrit 42 5 %      MCV 85 fL      MCH 29 7 pg      MCHC 35 1 g/dL      RDW 13 2 %      MPV 10 0 fL      Platelets 082 Thousands/uL      nRBC 0 /100 WBCs      Neutrophils Relative 60 %      Immat GRANS % 0 %      Lymphocytes Relative 28 %      Monocytes Relative 10 %      Eosinophils Relative 1 %      Basophils Relative 1 %      Neutrophils Absolute 6 02 Thousands/µL      Immature Grans Absolute 0 04 Thousand/uL      Lymphocytes Absolute 2 86 Thousands/µL      Monocytes Absolute 1 03 Thousand/µL      Eosinophils Absolute 0 09 Thousand/µL      Basophils Absolute 0 05 Thousands/µL                  No orders to display         Procedures  Procedures      ED Course                             SBIRT 22yo+      Most Recent Value   SBIRT (22 yo +)    In order to provide better care to our patients, we are screening all of our patients for alcohol and drug use  Would it be okay to ask you these screening questions? No Filed at: 2021 0024                MDM  Number of Diagnoses or Management Options  Threatened miscarriage: new and requires workup  Vaginal bleeding during pregnancy: new and requires workup     Amount and/or Complexity of Data Reviewed  Clinical lab tests: reviewed  Review and summarize past medical records: yes  Independent visualization of images, tracings, or specimens: yes    Risk of Complications, Morbidity, and/or Mortality  General comments: Mark Alonzo is a  12 week IUI pregnant 42year old female here today for evaluation of bright red vaginal bleeding with passage of clots    The bleeding was initially heavy but has since slowed  Patient is concerned and anxious that she might be having a miscarriage, this is her 1st pregnancy  She does not have any clotting or bleeding disorders  She has not take any blood thinners  Initially, the patient was tachycardic due to her anxiety over the possibility of a miscarriage  On exam, she did not have any tenderness to palpation of her abdomen  Vaginal exam showed minimal blood in the vaginal vault, she did have some bleeding coming from the cervical os, the os did not appear to be dilated  Transabdominal ultrasound showed fetus consistent with gestational age, it did have good fetal heart tones  Patient's CBC did not show her to be anemic  Patient was Rh factor positive, no need to give RhoGAM at this time  Beta hCG was over 25,000, I did not have any priors to compare to  I consulted with OBGYN who assessed the patient  Ob agreed with my assessment and believed that the cervical os was closed and that the fetus had good heart tones and movement, they believe the patient is having a threatened miscarriage, she is to follow up with them upon discharge from the emergency department  Patient given strict return precautions, she is agreeable with plan to follow up with Ob  Patient Progress  Patient progress: stable      Disposition  Final diagnoses:   Vaginal bleeding during pregnancy   Threatened miscarriage     Time reflects when diagnosis was documented in both MDM as applicable and the Disposition within this note     Time User Action Codes Description Comment    12/30/2021  1:33 AM Huey Devlin Add [O46 90] Vaginal bleeding during pregnancy     12/30/2021  3:26 AM Huey Devlin Add [O20 0] Threatened miscarriage       ED Disposition     ED Disposition Condition Date/Time Comment    Discharge Stable u Dec 30, 2021  3:26 AM [facility] discharge to home/self care              Follow-up Information     Follow up With Specialties Details Why Contact Info    Your OBGYN  Go to             Patient's Medications   Discharge Prescriptions    No medications on file     No discharge procedures on file  PDMP Review       Value Time User    PDMP Reviewed  Yes 12/30/2021 12:18 AM Mahendra Dejesus MD           ED Provider  Attending physically available and evaluated Juaquin Eden  I managed the patient along with the ED Attending      Electronically Signed by         Seda Meyer, DO  12/30/21 [facility], DO  12/30/21 2713

## 2022-01-03 ENCOUNTER — ROUTINE PRENATAL (OUTPATIENT)
Dept: OBGYN CLINIC | Facility: CLINIC | Age: 47
End: 2022-01-03

## 2022-01-03 VITALS
WEIGHT: 180.6 LBS | TEMPERATURE: 97.7 F | BODY MASS INDEX: 31 KG/M2 | SYSTOLIC BLOOD PRESSURE: 118 MMHG | DIASTOLIC BLOOD PRESSURE: 74 MMHG

## 2022-01-03 DIAGNOSIS — Z3A.13 PREGNANCY WITH 13 COMPLETED WEEKS GESTATION: Primary | ICD-10-CM

## 2022-01-03 PROCEDURE — PNV: Performed by: OBSTETRICS & GYNECOLOGY

## 2022-01-03 NOTE — PROGRESS NOTES
Patient reports no fm, no v, headache, cramping,, loss of fluid, edema, dom violence, or smoking  jennifer pnv  Just light spotting now from subchorionic hemorrhage  Urine neg/ negative,   Patient received some Zofran in the ER and had anxiety reaction  Had some constipation following is slowly improving  Patient does get nausea and has difficulty hydrating,  Discussed and reviewed  Has  Center follow-up is a little nervous about the spotting has not yet started baby aspirin which I encouraged her to wait until bleeding completely done    Patient will return in 2 weeks for checkup or sooner as needed

## 2022-01-04 ENCOUNTER — ROUTINE PRENATAL (OUTPATIENT)
Dept: PERINATAL CARE | Facility: OTHER | Age: 47
End: 2022-01-04
Payer: COMMERCIAL

## 2022-01-04 VITALS
DIASTOLIC BLOOD PRESSURE: 84 MMHG | WEIGHT: 181 LBS | HEIGHT: 64 IN | BODY MASS INDEX: 30.9 KG/M2 | SYSTOLIC BLOOD PRESSURE: 131 MMHG | HEART RATE: 90 BPM

## 2022-01-04 DIAGNOSIS — O20.9 FIRST TRIMESTER BLEEDING: ICD-10-CM

## 2022-01-04 DIAGNOSIS — Z3A.13 13 WEEKS GESTATION OF PREGNANCY: ICD-10-CM

## 2022-01-04 DIAGNOSIS — O09.521 MULTIGRAVIDA OF ADVANCED MATERNAL AGE IN FIRST TRIMESTER: ICD-10-CM

## 2022-01-04 DIAGNOSIS — Z34.01 ENCOUNTER FOR SUPERVISION OF NORMAL FIRST PREGNANCY IN FIRST TRIMESTER: ICD-10-CM

## 2022-01-04 DIAGNOSIS — O41.8X10 SUBCHORIONIC HEMATOMA IN FIRST TRIMESTER, SINGLE OR UNSPECIFIED FETUS: Primary | ICD-10-CM

## 2022-01-04 DIAGNOSIS — O46.8X1 SUBCHORIONIC HEMATOMA IN FIRST TRIMESTER, SINGLE OR UNSPECIFIED FETUS: Primary | ICD-10-CM

## 2022-01-04 PROBLEM — O09.529 ANTEPARTUM MULTIGRAVIDA OF ADVANCED MATERNAL AGE: Status: ACTIVE | Noted: 2022-01-04

## 2022-01-04 PROBLEM — E03.8 SUBCLINICAL HYPOTHYROIDISM: Status: ACTIVE | Noted: 2021-12-30

## 2022-01-04 PROBLEM — N97.9 INFERTILITY, FEMALE: Status: ACTIVE | Noted: 2022-01-04

## 2022-01-04 PROCEDURE — 76801 OB US < 14 WKS SINGLE FETUS: CPT | Performed by: OBSTETRICS & GYNECOLOGY

## 2022-01-04 PROCEDURE — 99214 OFFICE O/P EST MOD 30 MIN: CPT | Performed by: OBSTETRICS & GYNECOLOGY

## 2022-01-04 NOTE — LETTER
January 4, 2022     Estrada Matos PA-C  [address]    Patient: Leila Guzman   YOB: 1975   Date of Visit: 1/4/2022       Dear Dr Dimitrios Maldonado: Thank you for referring Carole Machado to me for evaluation  Below are my notes for this consultation  If you have questions, please do not hesitate to call me  I look forward to following your patient along with you  Sincerely,        Joao Poon MD        CC: No Recipients  Joao Poon MD  1/4/2022  6:31 PM  Sign when Signing Visit  Leila Guzman is 13w1d  She reports her prior vaginal bleeding is no longer bright red and has lessened significantly  She is completing her NIPT screen today  She returns today for fetal viability  Problem list:  1  Chronic hypertension on labetalol 200 milligram b i d   2  Subclinical Hypothyroidism  She was started on Synthroid 25 mcg for a tsh of 2 57 on 11/3/21 prior to conceiving  3  Advanced maternal age  3  IUI pregnancy  5  First-trimester vaginal bleeding and a subchorionic hemorrhage were found on 12/30/21  Her blood type is A+  6  History of a COVID infection in the 1st trimester after completing her J and J COVID vaccination 8/23/21  She is planning a COVID booster shot which can be 6 months after her initial vaccine irregardless of her Covid infection    Ultrasound findings: The ultrasound today a viable fetus with normal fluid and the subchorionic hematoma is measuring smaller than it was on 8/23/21  Pregnancy ultrasound has limitations and is unable to detect all forms of fetal congenital abnormalities  Follow up recommended:   1  She is set up for a 20 week anatomy scan  2  She is holding her baby aspirin currently secondary to this episode of bleeding  Can resume when the bleeding stops or by 16 weeks if she continues to have no further evidence of bright red bleeding    3  Recommend pelvic rest   4  Her COVID booster shot can be given 6 months after her previous vaccine  5  Our last US report was using the HELGA based on a 10 week scan but should actually be based on her conception date of 10/17/21 and 10/18/21 which gives a due date of 7/10/22 or 7/11/21  Review of her [facility] records ( page 23) report they are using a due date of 7/11/22  I have kept her due date as per her [facility] provider at 7/11/22 since that is what she has been using from the beginning  Pre visit time reviewing her records   10 minutes  Face to face time 15 minutes  Post visit time on documentation of note, updating her problem list, adding orders and prescriptions 15 minutes  Procedures that were completed today were charged separately  The level of decision making was moderate complexity      Roya Kirby MD

## 2022-01-04 NOTE — LETTER
January 4, 2022     Michele Tate PA-C  [address]    Patient: Miles Humphries   YOB: 1975   Date of Visit: 1/4/2022       Dear Dr Khalida Chiu: Thank you for referring Osorio Navarrete to me for evaluation  Below are my notes for this consultation  If you have questions, please do not hesitate to call me  I look forward to following your patient along with you  Sincerely,        Edenilson Yen MD        CC: No Recipients  Edenilson Yen MD  1/4/2022  6:14 PM  Sign when Signing Visit  Miles Humphries is 13w1d  She reports her prior vaginal bleeding is no longer bright red and has lessened significantly  She is completing her NIPT screen today  She returns today for fetal viability  Problem list:  1  Chronic hypertension on labetalol 200 milligram b i d   2  Subclinical Hypothyroidism  She was started on Synthroid 25 mcg for a tsh of 2 57 on 11/3/21 prior to conceiving  3  Advanced maternal age  3  IUI pregnancy  5  First-trimester vaginal bleeding and a subchorionic hemorrhage were found on 12/30/21  Her blood type is A+  6  History of a COVID infection in the 1st trimester after completing her J and J COVID vaccination 8/23/21  She is planning a COVID booster shot which can be 6 months after her initial vaccine irregardless of her Covid infection    Ultrasound findings: The ultrasound today a viable fetus with normal fluid and the subchorionic hematoma is measuring smaller than it was on 8/23/21  Pregnancy ultrasound has limitations and is unable to detect all forms of fetal congenital abnormalities  Follow up recommended:   1  She is set up for a 20 week anatomy scan  2  She is holding her baby aspirin currently secondary to this episode of bleeding  Can resume when the bleeding stops or by 16 weeks if she continues to have no further evidence of bright red bleeding    3  Recommend pelvic rest   4  Her COVID booster shot can be given 6 months after her previous vaccine  5  Our last US report was using the HELGA based on a 10 week scan but should actually be based on her conception date of 10/17/21 which gives a due date of 7/10/22  Review her [facility] records ( page 23) report they are using a due date of 7/11/22 based on a ovulation date of 10/16/21  I have kept her due date as per her [facility] provider at 7/11/22 since that is what she has been using from the beginning and its only 1 day off  Pre visit time reviewing her records   10 minutes  Face to face time 15 minutes  Post visit time on documentation of note, updating her problem list, adding orders and prescriptions 15 minutes  Procedures that were completed today were charged separately  The level of decision making was moderate complexity      Lizzette Castillo MD

## 2022-01-04 NOTE — PROGRESS NOTES
Ariadna Manning is 13w1d  She reports her prior vaginal bleeding is no longer bright red and has lessened significantly  She is completing her NIPT screen today  She returns today for fetal viability  Problem list:  1  Chronic hypertension on labetalol 200 milligram b i d   2  Subclinical Hypothyroidism  She was started on Synthroid 25 mcg for a tsh of 2 57 on 11/3/21 prior to conceiving  3  Advanced maternal age  3  IUI pregnancy  5  First-trimester vaginal bleeding and a subchorionic hemorrhage were found on 12/30/21  Her blood type is A+  6  History of a COVID infection in the 1st trimester after completing her J and J COVID vaccination 8/23/21  She is planning a COVID booster shot which can be 6 months after her initial vaccine irregardless of her Covid infection    Ultrasound findings: The ultrasound today a viable fetus with normal fluid and the subchorionic hematoma is measuring smaller than it was on 8/23/21  Pregnancy ultrasound has limitations and is unable to detect all forms of fetal congenital abnormalities  Follow up recommended:   1  She is set up for a 20 week anatomy scan  2  She is holding her baby aspirin currently secondary to this episode of bleeding  Can resume when the bleeding stops or by 16 weeks if she continues to have no further evidence of bright red bleeding  3  Recommend pelvic rest   4  Her COVID booster shot can be given 6 months after her previous vaccine  5  Our last US report was using the HELGA based on a 10 week scan but should actually be based on her conception date of 10/17/21 and 10/18/21 which gives a due date of 7/10/22 or 7/11/21  Review of her [facility] records ( page 23) report they are using a due date of 7/11/22  I have kept her due date as per her [facility] provider at 7/11/22 since that is what she has been using from the beginning      Pre visit time reviewing her records   10 minutes  Face to face time 15 minutes  Post visit time on documentation of note, updating her problem list, adding orders and prescriptions 15 minutes  Procedures that were completed today were charged separately  The level of decision making was moderate complexity      Fabio Gerard MD

## 2022-01-04 NOTE — Clinical Note
I saw the patient you copied me in on last week who had the subchorionic hemorrhage  Things look good  Hematoma is decreasing  She was questioning her dating today as her [facility] provider was giving her a due date of July 11th and our office was using July 8th  As I looked into her calculations for her due date, July 11th was based on when she ovulated on 10/16/21 and I suspect the insemination planned for the following 2 days and the July 8th due date was based on a 10 week scan by her OB despite that she had other earlier scans through [facility]  Her 1st insemination was on 10/17/21 and her second was on 10/18/21  These would give a due date of 7/10/22 and 7/11/22 respectively so I just went with what her [facility] was using which was 7/11/22  I wanted to let you know and if you agree then you can change your HELGA on your report as well       Desmond Rose

## 2022-01-10 ENCOUNTER — TELEPHONE (OUTPATIENT)
Dept: OBGYN CLINIC | Facility: CLINIC | Age: 47
End: 2022-01-10

## 2022-01-10 NOTE — TELEPHONE ENCOUNTER
Dr Kirsten Chao - Can take Tylenol that is to decrease cramping, continue to monitor symptoms until OB apt 1/17/22

## 2022-01-10 NOTE — TELEPHONE ENCOUNTER
Reviewed with pt  Pt is comfortable with that  She will continue to monitor  Pt aware to continue to update the office on her S/S if she has any changes in bleeding - increased apt, clots, red or pink noticed  Pt is going to try the Tylenol if cramping worsens or notices changes will contact office to discuss and review

## 2022-01-10 NOTE — TELEPHONE ENCOUNTER
Pt 14w0d called to update on symptoms  Pt has a subchorionic hematoma seen at [facility] apt 1/4/22  Pt states Saturday night she noticed accumulation on her pad when she never saw on her pad in the past  Bleeding is dark brown and not a large amount of pad  Also noticed pelvic cramping Saturday night  Alfonzo she started to experience diarrhea  Denies bright red bleeding or clots  Denies cramps worse then menstrual cramps, sharp stabbing or shooting pain  Pt aware will review with the on call doctor and call her back  Currently Dr Yohannes Natarajan in on L&D completing surgery, will discuss once she's free

## 2022-01-11 LAB
T4 FREE SERPL-MCNC: 1.1 NG/DL (ref 0.8–1.8)
TSH SERPL-ACNC: 2.64 MIU/L

## 2022-01-19 LAB
# FETUSES US: 1
CFDNA.FET/TOTAL PLAS.CFDNA: NORMAL
FET CHR 13 TS PLAS.CFDNA QL: NEGATIVE
FET CHR 18 TS PLAS.CFDNA QL: NEGATIVE
FET CHR 21 TS PLAS.CFDNA QL: NEGATIVE
FET CHR X + Y ANEUP PLAS.CFDNA QL: NORMAL
FET CHROM X + Y ANEUP CFDNA IMP: NORMAL
FET Y CHROM PLAS.CFDNA QL: DETECTED
FET Y CHROM PLAS.CFDNA: NORMAL
GA (DAYS): 5 D
GA (WEEKS): 14 WK
MICRODELETION SYND BLD/T FISH: NORMAL
REF LAB TEST METHOD: NORMAL
SERVICE CMNT-IMP: NORMAL
SERVICE CMNT-IMP: NORMAL
SL AMB ABNORMAL MSS?: NORMAL
SL AMB ABNORMAL US?: NORMAL
SL AMB ADVANCED MATERNAL AGE?: YES
SL AMB MICRODELETION INTERP: NORMAL
SL AMB MICRODELETION: NORMAL
SL AMB PERSONAL/FAM HISTORY?: NORMAL
SL AMB SPECIFICATIONS: NORMAL

## 2022-01-20 ENCOUNTER — ROUTINE PRENATAL (OUTPATIENT)
Dept: OBGYN CLINIC | Facility: CLINIC | Age: 47
End: 2022-01-20

## 2022-01-20 VITALS — WEIGHT: 180 LBS | DIASTOLIC BLOOD PRESSURE: 60 MMHG | SYSTOLIC BLOOD PRESSURE: 110 MMHG | BODY MASS INDEX: 30.9 KG/M2

## 2022-01-20 DIAGNOSIS — O46.8X1 SUBCHORIONIC HEMATOMA IN FIRST TRIMESTER, SINGLE OR UNSPECIFIED FETUS: ICD-10-CM

## 2022-01-20 DIAGNOSIS — O41.8X10 SUBCHORIONIC HEMATOMA IN FIRST TRIMESTER, SINGLE OR UNSPECIFIED FETUS: ICD-10-CM

## 2022-01-20 DIAGNOSIS — Z34.02 ENCOUNTER FOR SUPERVISION OF NORMAL FIRST PREGNANCY IN SECOND TRIMESTER: Primary | ICD-10-CM

## 2022-01-20 PROCEDURE — PNV: Performed by: NURSE PRACTITIONER

## 2022-01-20 NOTE — PROGRESS NOTES
OFFICE VISIT: Denies any N/V, HA, LOF, Edema, Domestic Violence, Smoking  No FM yet  Tolerating PNV  Continues to have brownish spotting on her jose pad, and when wiping  No further gushes of blood or any bright red bleeding  Some mild cramping on occasion  Did see normal NIPT results, It's a boy!! Rx for AFP given  Has anatomy scan scheduled with MFM  Planning on starting aspirin tomorrow, was advised to start prior to 16 weeks  RTO 4 weeks or sooner as needed

## 2022-01-21 NOTE — RESULT ENCOUNTER NOTE
[address] staff, I've reviewed this NIPT result which is low-risk  I sent her a [facility] results message  MSAFP has been ordered by her OB  Thank you    Carlton Rinne, MD

## 2022-01-27 LAB
# FETUSES US: 1
AFP ADJ MOM SERPL: 0.58
AFP INTERP SERPL-IMP: NORMAL
AFP SERPL-MCNC: 17.7 NG/ML
AGE: NORMAL
DONATED EGG PATIENT QL: NO
GA CLIN EST: 16.1 WEEKS
GA METHOD: NORMAL
HX OF NTD NARR: NO
HX OF TRISOMY 21 NARR: NO
IDDM PATIENT QL: NO
NEURAL TUBE DEFECT RISK FETUS: NORMAL %
SL AMB REPEAT SPECIMEN: NO

## 2022-02-08 ENCOUNTER — ROUTINE PRENATAL (OUTPATIENT)
Dept: OBGYN CLINIC | Facility: CLINIC | Age: 47
End: 2022-02-08

## 2022-02-08 VITALS — DIASTOLIC BLOOD PRESSURE: 88 MMHG | SYSTOLIC BLOOD PRESSURE: 130 MMHG | WEIGHT: 190 LBS | BODY MASS INDEX: 32.61 KG/M2

## 2022-02-08 DIAGNOSIS — E03.8 SUBCLINICAL HYPOTHYROIDISM: Primary | ICD-10-CM

## 2022-02-08 DIAGNOSIS — N97.9 INFERTILITY, FEMALE: ICD-10-CM

## 2022-02-08 PROCEDURE — PNV: Performed by: PHYSICIAN ASSISTANT

## 2022-02-08 RX ORDER — ASPIRIN 81 MG/1
81 TABLET, CHEWABLE ORAL DAILY
Status: ON HOLD | COMMUNITY
End: 2022-07-01 | Stop reason: ALTCHOICE

## 2022-02-08 NOTE — PROGRESS NOTES
Visit: mild nausea, no vomiting  Mild headaches, regular  Reviewed tylenol, rest, hydration, Magnesium and Riboflavin  No FM, cramping, VB, LOF, edema, domestic violence, or smoking  Tolerating PNV  Has Level II ultrasound scheduled  Continue routine prenatal care  Return to office in 4 weeks for ob check

## 2022-02-17 ENCOUNTER — ROUTINE PRENATAL (OUTPATIENT)
Dept: OBGYN CLINIC | Facility: CLINIC | Age: 47
End: 2022-02-17

## 2022-02-17 VITALS — SYSTOLIC BLOOD PRESSURE: 120 MMHG | WEIGHT: 190.2 LBS | BODY MASS INDEX: 32.65 KG/M2 | DIASTOLIC BLOOD PRESSURE: 80 MMHG

## 2022-02-17 DIAGNOSIS — Z3A.19 19 WEEKS GESTATION OF PREGNANCY: Primary | ICD-10-CM

## 2022-02-17 DIAGNOSIS — E03.8 SUBCLINICAL HYPOTHYROIDISM: ICD-10-CM

## 2022-02-17 DIAGNOSIS — Z34.02 ENCOUNTER FOR SUPERVISION OF NORMAL FIRST PREGNANCY IN SECOND TRIMESTER: ICD-10-CM

## 2022-02-17 PROCEDURE — PNV: Performed by: STUDENT IN AN ORGANIZED HEALTH CARE EDUCATION/TRAINING PROGRAM

## 2022-02-17 RX ORDER — LEVOTHYROXINE SODIUM 0.03 MG/1
25 TABLET ORAL DAILY
Qty: 30 TABLET | Refills: 2 | Status: SHIPPED | OUTPATIENT
Start: 2022-02-17 | End: 2022-05-24

## 2022-02-17 RX ORDER — LANOLIN ALCOHOL/MO/W.PET/CERES
4 CREAM (GRAM) TOPICAL
COMMUNITY
End: 2022-07-07 | Stop reason: ALTCHOICE

## 2022-02-17 RX ORDER — DOCUSATE SODIUM 100 MG/1
100 CAPSULE, LIQUID FILLED ORAL 2 TIMES DAILY
COMMUNITY

## 2022-02-17 RX ORDER — MAGNESIUM 30 MG
30 TABLET ORAL 2 TIMES DAILY
Status: ON HOLD | COMMUNITY
End: 2022-07-01 | Stop reason: ALTCHOICE

## 2022-02-17 NOTE — PROGRESS NOTES
OB/GYN prenatal visit    S: 42 y o  Vanessa Slaughter 19w3d here for PN visit  She has no obstetric complaints, including pelvic pain, contractions, vaginal bleeding, loss of fluid   +flutters    O:  Vitals:    02/17/22 1629   BP: 120/80       Gen: no acute distress, nonlabored breathing  Fundal Height (cm): 19 cm  Fetal Heart Rate: 151    A/P:  IUP at 19w3d  No obstetric complaints today  First tri labs notable     Rh status POS  GBS NA    Next ultrasound: 02/21/22    Flu vaccine--received, TDAP vaccine--not due, covid vaccine--J&J, had covid and waiting for 3 months before booster    Contraception: NA  Breastfeeding: NA  Birth plan: NA    Discussed pre-term labor precautions and fetal kick counts    Return to office in 4 weeks    COVID 19 precautions were discussed with patient at length, reviewed symptoms, hygiene, social distancing, patient to call office 24/7 with questions/concerns      Keyona Jackson MD  2/17/2022  4:42 PM

## 2022-02-17 NOTE — PATIENT INSTRUCTIONS
Pregnancy at 23 to 22 Weeks   AMBULATORY CARE:   What changes are happening with your body:  Now that you are in your second trimester, you have more energy  You may also be feeling hungrier than usual  You may be gaining about ½ to 1 pound a week, and your pregnancy is beginning to show  You may need to start wearing maternity clothes  As your baby gets larger, you may have other symptoms  These may include body aches or stretch marks on your abdomen, breasts, thighs, or buttocks  Seek care immediately if:   · You develop a severe headache that does not go away  · You have new or increased vision changes, such as blurred or spotted vision  · You have new or increased swelling in your face or hands  · You have vaginal spotting or bleeding  · Your water broke or you feel warm water gushing or trickling from your vagina  Call your doctor or obstetrician if:   · You have abdominal cramps, pressure, or tightening  · You have a change in vaginal discharge  · You cannot keep food or drinks down, and you are losing weight  · You have chills or a fever  · You have vaginal itching, burning, or pain  · You have yellow, green, white, or foul-smelling vaginal discharge  · You have pain or burning when you urinate, less urine than usual, or pink or bloody urine  · You have questions or concerns about your condition or care  How to care for yourself at this stage of your pregnancy:       · Eat a variety of healthy foods  Healthy foods include fruits, vegetables, whole-grain breads, low-fat dairy foods, beans, lean meats, and fish  Drink liquids as directed  Ask how much liquid to drink each day and which liquids are best for you  Limit caffeine to less than 200 milligrams each day  Limit your intake of fish to 2 servings each week  Choose fish low in mercury such as canned light tuna, shrimp, salmon, cod, or tilapia   Do not  eat fish high in mercury such as swordfish, tilefish, ivet mackerel, and shark  · Take prenatal vitamins as directed  Your need for certain vitamins and minerals, such as folic acid, increases during pregnancy  Prenatal vitamins provide some of the extra vitamins and minerals you need  Prenatal vitamins may also help to decrease the risk of certain birth defects  · Talk to your healthcare provider about exercise  Moderate exercise can help you stay fit  Your healthcare provider will help you plan an exercise program that is safe for you during pregnancy  · Do not smoke  Smoking increases your risk of a miscarriage and other health problems during your pregnancy  Smoking can cause your baby to be born too early or weigh less at birth  Ask your healthcare provider for information if you need help quitting  · Do not drink alcohol  Alcohol passes from your body to your baby through the placenta  It can affect your baby's brain development and cause fetal alcohol syndrome (FAS)  FAS is a group of conditions that causes mental, behavior, and growth problems  · Talk to your healthcare provider before you take any medicines  Many medicines may harm your baby if you take them when you are pregnant  Do not take any medicines, vitamins, herbs, or supplements without first talking to your healthcare provider  Never use illegal or street drugs (such as marijuana or cocaine) while you are pregnant  Safety tips during pregnancy:   · Avoid hot tubs and saunas  Do not use a hot tub or sauna while you are pregnant, especially during your first trimester  Hot tubs and saunas may raise your baby's temperature and increase the risk of birth defects  · Avoid toxoplasmosis  This is an infection caused by eating raw meat or being around infected cat feces  It can cause birth defects, miscarriages, and other problems  Wash your hands after you touch raw meat  Make sure any meat is well-cooked before you eat it  Avoid raw eggs and unpasteurized milk   Use gloves or ask someone else to clean your cat's litter box while you are pregnant  Changes happening with your baby:  By 22 weeks, your baby is about 8 inches long from the top of the head to the rump (baby's bottom)  Your baby also weighs about 1 pound  Your baby is becoming much more active  You may be able to feel the baby move inside you now  The first movements may not be that noticeable  They may feel like a fluttering sensation  As time goes on, your baby's movements will become stronger and more noticeable  What you need to know about prenatal care:  During the first 28 weeks of your pregnancy, you will see your healthcare provider once a month  Your healthcare provider will check your blood pressure and weight  You may also need the following:  · A urine test  may also be done to check for sugar and protein  These can be signs of gestational diabetes or infection  Protein in your urine may also be a sign of preeclampsia  Preeclampsia is a condition that can develop during week 20 or later of your pregnancy  It causes high blood pressure, and it can cause problems with your kidneys and other organs  · Fundal height  is a measurement of your uterus to check your baby's growth  This number is usually the same as the number of weeks that you have been pregnant  · A fetal ultrasound  shows pictures of your baby inside your uterus  It shows your baby's development  The movement and position of your baby can also be seen  Your healthcare provider may be able to tell you what your baby's gender is during the ultrasound  · Your baby's heart rate  will be checked  Follow up with your obstetrician as directed:  Write down your questions so you remember to ask them during your visits  © Copyright [employer] 2021 Information is for End User's use only and may not be sold, redistributed or otherwise used for commercial purposes   All illustrations and images included in CareNotes® are the copyrighted property of A D A M , Inc  or [facility] Skye Morley   The above information is an  only  It is not intended as medical advice for individual conditions or treatments  Talk to your doctor, nurse or pharmacist before following any medical regimen to see if it is safe and effective for you

## 2022-02-21 ENCOUNTER — ROUTINE PRENATAL (OUTPATIENT)
Dept: PERINATAL CARE | Facility: CLINIC | Age: 47
End: 2022-02-21
Payer: COMMERCIAL

## 2022-02-21 VITALS
HEART RATE: 99 BPM | SYSTOLIC BLOOD PRESSURE: 134 MMHG | WEIGHT: 191.6 LBS | HEIGHT: 64 IN | BODY MASS INDEX: 32.71 KG/M2 | DIASTOLIC BLOOD PRESSURE: 63 MMHG

## 2022-02-21 DIAGNOSIS — Z36.86 ENCOUNTER FOR ANTENATAL SCREENING FOR CERVICAL LENGTH: ICD-10-CM

## 2022-02-21 DIAGNOSIS — O09.529 ANTEPARTUM MULTIGRAVIDA OF ADVANCED MATERNAL AGE: Primary | ICD-10-CM

## 2022-02-21 DIAGNOSIS — Z36.3 ENCOUNTER FOR ANTENATAL SCREENING FOR MALFORMATIONS: ICD-10-CM

## 2022-02-21 PROCEDURE — 76811 OB US DETAILED SNGL FETUS: CPT | Performed by: OBSTETRICS & GYNECOLOGY

## 2022-02-21 PROCEDURE — 76817 TRANSVAGINAL US OBSTETRIC: CPT | Performed by: OBSTETRICS & GYNECOLOGY

## 2022-02-21 PROCEDURE — 99214 OFFICE O/P EST MOD 30 MIN: CPT | Performed by: OBSTETRICS & GYNECOLOGY

## 2022-02-21 NOTE — PROGRESS NOTES
[address]: Ms Nino Frank was seen today for anatomic survey and cervical length screening ultrasound  See ultrasound report under "OB Procedures" tab  The time spent on this established patient on the encounter date included 5 minutes previsit service time reviewing records and precharting, 10 minutes face-to-face service time counseling regarding results and coordinating care, and  5 minutes charting, totalling 20 minutes  Please don't hesitate to contact our office with any concerns or questions    Noa Keating MD

## 2022-02-21 NOTE — PATIENT INSTRUCTIONS
Thank you for choosing us for your  care today  If you have any questions about your ultrasound or care, please do not hesitate to contact us or your primary obstetrician  Some general instructions for your pregnancy are:     Protect against coronavirus: get vaccinated - pregnant women are increased risk of severe COVID  Notify your primary care doctor if you have any symptoms   Exercise: Aim for 22 minutes per day (150 minutes per week) of regular exercise  Walking is great!  Nutrition: aim for calcium-rich and iron-rich foods as well as healthy sources of protein   Learn about Preeclampsia: preeclampsia is a common, serious high blood pressure complication in pregnancy  A blood pressure of 461JPMG (systolic or top number) or 85DLMM (diastolic or bottom number) is not normal and needs evaluation by your doctor  Aspirin is sometimes prescribed in early pregnancy to prevent preeclampsia in women with risk factors - ask your obstetrician if you should be on this medication   If you smoke, try to reduce how many cigarettes you smoke or try to quit completely  Do not vape   Other warning signs to watch out for in pregnancy or postpartum: chest pain, obstructed breathing or shortness of breath, seizures, thoughts of hurting yourself or your baby, bleeding, a painful or swollen leg, fever, or headache (see AWHONN POST-BIRTH Warning Signs campaign)  If these happen call 911  Itching is also not normal in pregnancy and if you experience this, especially over your hands and feet, potentially worse at night, notify your doctors

## 2022-02-21 NOTE — PROGRESS NOTES
Ultrasound Probe Disinfection    A transvaginal ultrasound was performed  Prior to use, disinfection was performed with High Level Disinfection Process (Trophon)  Probe serial number [id number]: [id number] was used        Nikki Lindsey  02/21/22  1:38 PM

## 2022-03-04 ENCOUNTER — ROUTINE PRENATAL (OUTPATIENT)
Dept: OBGYN CLINIC | Facility: CLINIC | Age: 47
End: 2022-03-04

## 2022-03-04 VITALS
SYSTOLIC BLOOD PRESSURE: 134 MMHG | BODY MASS INDEX: 33.63 KG/M2 | HEIGHT: 64 IN | DIASTOLIC BLOOD PRESSURE: 82 MMHG | WEIGHT: 197 LBS

## 2022-03-04 DIAGNOSIS — Z34.02 ENCOUNTER FOR SUPERVISION OF NORMAL FIRST PREGNANCY IN SECOND TRIMESTER: ICD-10-CM

## 2022-03-04 DIAGNOSIS — Z3A.21 21 WEEKS GESTATION OF PREGNANCY: Primary | ICD-10-CM

## 2022-03-04 DIAGNOSIS — O10.919 CHRONIC HYPERTENSION AFFECTING PREGNANCY: ICD-10-CM

## 2022-03-04 DIAGNOSIS — N97.9 INFERTILITY, FEMALE: ICD-10-CM

## 2022-03-04 DIAGNOSIS — E03.8 SUBCLINICAL HYPOTHYROIDISM: ICD-10-CM

## 2022-03-04 PROBLEM — Z31.69 PROCREATIVE MANAGEMENT COUNSELING: Status: RESOLVED | Noted: 2021-08-26 | Resolved: 2022-03-04

## 2022-03-04 PROCEDURE — PNV: Performed by: OBSTETRICS & GYNECOLOGY

## 2022-03-04 RX ORDER — LABETALOL 200 MG/1
200 TABLET, FILM COATED ORAL EVERY 12 HOURS
Qty: 60 TABLET | Refills: 2 | Status: SHIPPED | OUTPATIENT
Start: 2022-03-04 | End: 2022-08-08

## 2022-03-28 NOTE — PATIENT INSTRUCTIONS
Thank you for choosing us for your  care today  If you have any questions about your ultrasound or care, please do not hesitate to contact us or your primary obstetrician  Please go to Labor and Delivery now for evaluation  The address of [address] [place] [address] is [place] [id number] ([facility] [place])  Some general instructions for your pregnancy are:     Protect against coronavirus: get vaccinated - pregnant women are increased risk of severe COVID  Notify your primary care doctor if you have any symptoms   Exercise: Aim for 22 minutes per day (150 minutes per week) of regular exercise  Walking is great!  Nutrition: aim for calcium-rich and iron-rich foods as well as healthy sources of protein   Learn about Preeclampsia: preeclampsia is a common, serious high blood pressure complication in pregnancy  A blood pressure of 571NVPQ (systolic or top number) or 56RSVL (diastolic or bottom number) is not normal and needs evaluation by your doctor  Aspirin is sometimes prescribed in early pregnancy to prevent preeclampsia in women with risk factors - ask your obstetrician if you should be on this medication   If you smoke, try to reduce how many cigarettes you smoke or try to quit completely  Do not vape   Other warning signs to watch out for in pregnancy or postpartum: chest pain, obstructed breathing or shortness of breath, seizures, thoughts of hurting yourself or your baby, bleeding, a painful or swollen leg, fever, or headache (see AWHONN POST-BIRTH Warning Signs campaign)  If these happen call 911  Itching is also not normal in pregnancy and if you experience this, especially over your hands and feet, potentially worse at night, notify your doctors  Abdomen soft, nontender, nondistended, bowel sounds present in all 4 quadrants.

## 2022-03-31 ENCOUNTER — ULTRASOUND (OUTPATIENT)
Dept: PERINATAL CARE | Facility: CLINIC | Age: 47
End: 2022-03-31
Payer: COMMERCIAL

## 2022-03-31 ENCOUNTER — HOSPITAL ENCOUNTER (OUTPATIENT)
Facility: HOSPITAL | Age: 47
Discharge: HOME | End: 2022-03-31
Attending: OBSTETRICS & GYNECOLOGY | Admitting: OBSTETRICS & GYNECOLOGY
Payer: COMMERCIAL

## 2022-03-31 ENCOUNTER — APPOINTMENT (OUTPATIENT)
Dept: ULTRASOUND IMAGING | Facility: HOSPITAL | Age: 47
End: 2022-03-31
Payer: COMMERCIAL

## 2022-03-31 VITALS
WEIGHT: 200.4 LBS | BODY MASS INDEX: 34.21 KG/M2 | SYSTOLIC BLOOD PRESSURE: 129 MMHG | DIASTOLIC BLOOD PRESSURE: 61 MMHG | HEART RATE: 95 BPM | HEIGHT: 64 IN

## 2022-03-31 VITALS
DIASTOLIC BLOOD PRESSURE: 69 MMHG | SYSTOLIC BLOOD PRESSURE: 129 MMHG | HEIGHT: 64 IN | RESPIRATION RATE: 16 BRPM | BODY MASS INDEX: 34.31 KG/M2 | WEIGHT: 201 LBS | HEART RATE: 94 BPM

## 2022-03-31 DIAGNOSIS — Z36.2 ENCOUNTER FOR FOLLOW-UP ULTRASOUND OF FETAL ANATOMY: ICD-10-CM

## 2022-03-31 DIAGNOSIS — O09.529 ANTEPARTUM MULTIGRAVIDA OF ADVANCED MATERNAL AGE: ICD-10-CM

## 2022-03-31 DIAGNOSIS — R10.11: Primary | ICD-10-CM

## 2022-03-31 DIAGNOSIS — O26.899: Primary | ICD-10-CM

## 2022-03-31 DIAGNOSIS — Z36.89 ENCOUNTER FOR ULTRASOUND TO CHECK FETAL GROWTH: ICD-10-CM

## 2022-03-31 DIAGNOSIS — O10.919 CHRONIC HYPERTENSION AFFECTING PREGNANCY: ICD-10-CM

## 2022-03-31 PROBLEM — Z3A.25 25 WEEKS GESTATION OF PREGNANCY: Status: ACTIVE | Noted: 2021-12-30

## 2022-03-31 LAB
ALBUMIN SERPL BCP-MCNC: 2.9 G/DL (ref 3.5–5)
ALP SERPL-CCNC: 74 U/L (ref 46–116)
ALT SERPL W P-5'-P-CCNC: 23 U/L (ref 12–78)
ANION GAP SERPL CALCULATED.3IONS-SCNC: 15 MMOL/L (ref 4–13)
AST SERPL W P-5'-P-CCNC: 20 U/L (ref 5–45)
BILIRUB SERPL-MCNC: 0.25 MG/DL (ref 0.2–1)
BUN SERPL-MCNC: 9 MG/DL (ref 5–25)
CALCIUM ALBUM COR SERPL-MCNC: 9.1 MG/DL (ref 8.3–10.1)
CALCIUM SERPL-MCNC: 8.2 MG/DL (ref 8.3–10.1)
CHLORIDE SERPL-SCNC: 104 MMOL/L (ref 100–108)
CO2 SERPL-SCNC: 22 MMOL/L (ref 21–32)
CREAT SERPL-MCNC: 0.69 MG/DL (ref 0.6–1.3)
CREAT UR-MCNC: 31 MG/DL
GFR SERPL CREATININE-BSD FRML MDRD: 106 ML/MIN/1.73SQ M
GLUCOSE SERPL-MCNC: 79 MG/DL (ref 65–140)
LIPASE SERPL-CCNC: 71 U/L (ref 73–393)
POTASSIUM SERPL-SCNC: 3.6 MMOL/L (ref 3.5–5.3)
PROT SERPL-MCNC: 6.5 G/DL (ref 6.4–8.2)
PROT UR-MCNC: <6 MG/DL
PROT/CREAT UR: <0.19 MG/G{CREAT} (ref 0–0.1)
SODIUM SERPL-SCNC: 141 MMOL/L (ref 136–145)

## 2022-03-31 PROCEDURE — 99214 OFFICE O/P EST MOD 30 MIN: CPT | Performed by: OBSTETRICS & GYNECOLOGY

## 2022-03-31 PROCEDURE — 76816 OB US FOLLOW-UP PER FETUS: CPT | Performed by: OBSTETRICS & GYNECOLOGY

## 2022-03-31 PROCEDURE — 84156 ASSAY OF PROTEIN URINE: CPT | Performed by: STUDENT IN AN ORGANIZED HEALTH CARE EDUCATION/TRAINING PROGRAM

## 2022-03-31 PROCEDURE — 99213 OFFICE O/P EST LOW 20 MIN: CPT

## 2022-03-31 PROCEDURE — 80053 COMPREHEN METABOLIC PANEL: CPT | Performed by: STUDENT IN AN ORGANIZED HEALTH CARE EDUCATION/TRAINING PROGRAM

## 2022-03-31 PROCEDURE — 76705 ECHO EXAM OF ABDOMEN: CPT

## 2022-03-31 PROCEDURE — 83690 ASSAY OF LIPASE: CPT | Performed by: STUDENT IN AN ORGANIZED HEALTH CARE EDUCATION/TRAINING PROGRAM

## 2022-03-31 PROCEDURE — 82570 ASSAY OF URINE CREATININE: CPT | Performed by: STUDENT IN AN ORGANIZED HEALTH CARE EDUCATION/TRAINING PROGRAM

## 2022-03-31 RX ORDER — FAMOTIDINE 20 MG/1
20 TABLET, FILM COATED ORAL ONCE
Status: COMPLETED | OUTPATIENT
Start: 2022-03-31 | End: 2022-03-31

## 2022-03-31 RX ADMIN — FAMOTIDINE 20 MG: 20 TABLET ORAL at 17:12

## 2022-03-31 NOTE — DISCHARGE INSTRUCTIONS
Pregnancy at 23 to 26 [address]:   You are now close to or at the beginning of the third trimester  The third trimester starts at 24 weeks and ends with delivery  As your baby gets larger, you may develop certain symptoms  These may include pain in your back or down the sides of your abdomen  You may also have stretch marks on your abdomen, breasts, thighs, or buttocks  You may also have constipation  DISCHARGE INSTRUCTIONS:   Seek care immediately if:   · You develop a severe headache that does not go away  · You have new or increased vision changes, such as blurred or spotted vision  · You have new or increased swelling in your face or hands  · You have vaginal spotting or bleeding  · Your water broke or you feel warm water gushing or trickling from your vagina  Call your doctor or obstetrician if:   · You have abdominal cramps, pressure, or tightening  · You have a change in vaginal discharge  · You have light bleeding  · You have chills or a fever  · You have vaginal itching, burning, or pain  · You have yellow, green, white, or foul-smelling vaginal discharge  · You have pain or burning when you urinate, less urine than usual, or pink or bloody urine  · You have questions or concerns about your condition or care  How to care for yourself at this stage of your pregnancy:       · Eat a variety of healthy foods  Healthy foods include fruits, vegetables, whole-grain breads, low-fat dairy foods, beans, lean meats, and fish  Drink liquids as directed  Ask how much liquid to drink each day and which liquids are best for you  Limit caffeine to less than 200 milligrams each day  Limit your intake of fish to 2 servings each week  Choose fish low in mercury such as canned light tuna, shrimp, salmon, cod, or tilapia  Do not  eat fish high in mercury such as swordfish, tilefish, ivet mackerel, and shark  · Manage back pain    Do not stand for long periods of time or lift heavy items  Use good posture while you stand, squat, or bend  Wear low-heeled shoes with good support  Rest may also help to relieve back pain  Ask your healthcare provider about exercises you can do to strengthen your back muscles  · Take prenatal vitamins as directed  Your need for certain vitamins and minerals, such as folic acid, increases during pregnancy  Prenatal vitamins provide some of the extra vitamins and minerals you need  Prenatal vitamins may also help to decrease the risk of certain birth defects  · Talk to your healthcare provider about exercise  Moderate exercise can help you stay fit  Your healthcare provider will help you plan an exercise program that is safe for you during pregnancy  · Do not smoke  Smoking increases your risk of a miscarriage and other health problems during your pregnancy  Smoking can cause your baby to be born too early or weigh less at birth  Ask your healthcare provider for information if you need help quitting  · Do not drink alcohol  Alcohol passes from your body to your baby through the placenta  It can affect your baby's brain development and cause fetal alcohol syndrome (FAS)  FAS is a group of conditions that causes mental, behavior, and growth problems  · Talk to your healthcare provider before you take any medicines  Many medicines may harm your baby if you take them when you are pregnant  Do not take any medicines, vitamins, herbs, or supplements without first talking to your healthcare provider  Never use illegal or street drugs (such as marijuana or cocaine) while you are pregnant  Safety tips:   · Avoid hot tubs and saunas  Do not use a hot tub or sauna while you are pregnant, especially during your first trimester  Hot tubs and saunas may raise your baby's temperature and increase the risk of birth defects  · Avoid toxoplasmosis    This is an infection caused by eating raw meat or being around infected cat feces  It can cause birth defects, miscarriages, and other problems  Wash your hands after you touch raw meat  Make sure any meat is well-cooked before you eat it  Avoid raw eggs and unpasteurized milk  Use gloves or ask someone else to clean your cat's litter box while you are pregnant  Changes that are happening with your baby:  By 26 weeks, your baby will weigh about 2 pounds  Your baby will be about 10 inches long from the top of the head to the rump (baby's bottom)  Your baby's movements are much stronger now  Your baby's eyes are almost completely formed and can partially open  Your baby also sleeps and wakes up  What you need to know about prenatal care: Your healthcare provider will check your blood pressure and weight  You may also need the following:  · A urine test  may also be done to check for sugar and protein  These can be signs of gestational diabetes or infection  Protein in your urine may also be a sign of preeclampsia  Preeclampsia is a condition that can develop during week 20 or later of your pregnancy  It causes high blood pressure, and it can cause problems with your kidneys and other organs  · A gestational diabetes screen  may be done  Your healthcare provider may order either a 1-step or 2-step oral glucose tolerance test (OGTT)  ? 1-step OGTT:  Your blood sugar level will be tested after you have not eaten for 8 hours (fasting)  You will then be given a glucose drink  Your level will be tested again 1 hour and 2 hours after you finish the drink  ? 2-step OGTT:  You do not have to fast for the first part of the test  You will have the glucose drink at any time of day  Your blood sugar level will be checked 1 hour later  If your blood sugar is higher than a certain level, another test will be ordered  You will fast and your blood sugar level will be tested  You will have the glucose drink   Your blood will be tested again 1 hour, 2 hours, and 3 hours after you finish the glucose drink  · Fundal height  is a measurement of your uterus to check your baby's growth  This number is usually the same as the number of weeks that you have been pregnant  · Your baby's heart rate  will be checked  Follow up with your doctor or obstetrician as directed:  Write down your questions so you remember to ask them during your visits  © Copyright "[employer] 2022 Information is for End User's use only and may not be sold, redistributed or otherwise used for commercial purposes  All illustrations and images included in CareNotes® are the copyrighted property of A D A M , Inc  or [facility] Skye Morley   The above information is an  only  It is not intended as medical advice for individual conditions or treatments  Talk to your doctor, nurse or pharmacist before following any medical regimen to see if it is safe and effective for you

## 2022-03-31 NOTE — LETTER
2022    Rogena Collet, [place]    Patient: Kathryn Roberts [facility]   YOB: 1975   Date of Visit: 3/31/2022   Gestational age Rachael Sayer of this communication: Priority: coming to L&D for evaluation       Dear Ted Nichols and Aurora Belcher,    This patient was seen recently in our  office  The content of my evaluation today will be located in the ultrasound report under "OB Procedures" tab  Please don't hesitate to contact our office with any concerns or questions       Sincerely,      Birt Najjar, MD  Attending Physician, [facility]

## 2022-03-31 NOTE — PROGRESS NOTES
Report lynne texted to JOEL Kelly (current L&D charge nurse at [facility]) to inform her of pts impending arrival for severe right upper quadrant pain

## 2022-03-31 NOTE — PROGRESS NOTES
[address]: Ms Chuyita Hernández was seen today for fetal growth and followup missed anatomy ultrasound  See ultrasound report under "OB Procedures" tab  Review of Systems   Constitutional: Negative for chills and fever  Eyes: Negative for visual disturbance  Respiratory: Negative for cough  Occasional SOB   Gastrointestinal: Negative for diarrhea, nausea and vomiting  Reports intense RUQ pain x 1 week, possibly related to meals though not consistently, worsening since onset kenisha over past week   Genitourinary: Negative for vaginal bleeding  Skin: Negative  Neurological: Negative for headaches  Hematological: Does not bruise/bleed easily  +varicose veins     Physical Exam  Constitutional:       General: She is not in acute distress  Appearance: Normal appearance  She is not ill-appearing, toxic-appearing or diaphoretic  HENT:      Head: Normocephalic and atraumatic  Nose: No congestion or rhinorrhea  Eyes:      General: No scleral icterus  Right eye: No discharge  Left eye: No discharge  Extraocular Movements: Extraocular movements intact  Conjunctiva/sclera: Conjunctivae normal    Pulmonary:      Effort: Pulmonary effort is normal  No respiratory distress  Abdominal:      Tenderness: There is abdominal tenderness  There is guarding  Comments: RUQ pain, close to midline; voluntary guarding   Musculoskeletal:      Cervical back: Normal range of motion  Skin:     Coloration: Skin is not jaundiced or pale  Findings: No erythema, lesion or rash  Neurological:      General: No focal deficit present  Mental Status: She is alert and oriented to person, place, and time  Psychiatric:         Mood and Affect: Mood normal          Behavior: Behavior normal        MDM:   I  Diagnoses/Problems addressed:  Acute illness with systemic symptoms: severe RUQ pain  III    Risk of morbidity: Moderate (dispositioned to L&D for evaluation)    Please don't hesitate to contact our office with any concerns or questions    Shelly Cunningham MD

## 2022-03-31 NOTE — PROGRESS NOTES
L&D Triage Note - OB/GYN  Keisha Reddy 42 y o  female MRN: [id number]  Unit/Bed#:  [facility]  Encounter: [id number]      Assessment:  42 y o  Linda Sanchez at 25w3d presenting with RUQ pain    Plan:  1  RUQ pain  - 2-3 wks of pain RUQ beneath the right breast near the midclavicular line  Worse after most meals and tender to palpation  No f/c  Nausea w/o vomiting  BMs w/o constipation/diarrhea/discoloration  No history of hepatitis or STI  No urinary symptoms apart from frequency  No HA, LH, Blurred vision, Edema, Vaginal discharge/bleeding  Good fetal movement reported  - POCUS w/o obvious stone in the GB or thickening the wall  - CBC, CMP, Lipase, P:C ratio, and RUQ US pending        ______________________________________________________________________      Chief Compliant: RUQ pain    TIME: 1600  Subjective:  42 y o   at 25w3d RUQ pain for 2-3 wks beneath the right breast near the midclavicular line  Worse after most meals and tender to palpation  No f/c  Nausea w/o vomiting  BMs w/o constipation/diarrhea/discoloration  No history of hepatitis or STI  No urinary symptoms apart from frequency  No HA, LH, Blurred vision, Edema, Vaginal discharge/bleeding  Good fetal movement reported  Review of Systems   Constitutional: Negative for activity change, chills and fever  HENT: Negative for congestion, ear pain, hearing loss, rhinorrhea, sore throat and trouble swallowing  Only seasonal allergies     Eyes: Negative for pain and visual disturbance  Respiratory: Negative for cough and shortness of breath  Cardiovascular: Negative for chest pain and palpitations  Gastrointestinal: Positive for abdominal pain (RUQ)  Negative for blood in stool, constipation, diarrhea, nausea and vomiting  Endocrine: Positive for polyuria  Genitourinary: Negative for dysuria, hematuria and vaginal discharge  Musculoskeletal: Negative for arthralgias and myalgias     Neurological: Negative for dizziness, light-headedness and headaches  Psychiatric/Behavioral: The patient is nervous/anxious  Objective: There were no vitals filed for this visit  Physical Exam  Constitutional:       General: She is not in acute distress  Appearance: Normal appearance  She is not ill-appearing or toxic-appearing  HENT:      Head: Normocephalic and atraumatic  Mouth/Throat:      Mouth: Mucous membranes are moist    Eyes:      Pupils: Pupils are equal, round, and reactive to light  Cardiovascular:      Rate and Rhythm: Regular rhythm  Tachycardia present  Heart sounds: Normal heart sounds  Pulmonary:      Effort: Pulmonary effort is normal       Breath sounds: Normal breath sounds  Abdominal:      Palpations: Abdomen is soft  Tenderness: There is abdominal tenderness (RUQ)  Musculoskeletal:         General: Normal range of motion  Cervical back: Normal range of motion  Skin:     General: Skin is warm and dry  Neurological:      Mental Status: She is alert  Psychiatric:         Mood and Affect: Mood normal          Behavior: Behavior normal          FHT:  150 / Moderate 6 - 25 bpm / present accelerations, decelerations absent  [place]: no contractions      Aneita Ryegate, DO 3/31/2022 4:24 PM    Patient seen and examined case and note reviewed agree  Await RUQ ultrasound  Lab Results   Component Value Date    SODIUM 141 03/31/2022    K 3 6 03/31/2022     03/31/2022    CO2 22 03/31/2022    AGAP 15 (H) 03/31/2022    BUN 9 03/31/2022    CREATININE 0 69 03/31/2022    GLUC 79 03/31/2022    CALCIUM 8 2 (L) 03/31/2022    AST 20 03/31/2022    ALT 23 03/31/2022    ALKPHOS 74 03/31/2022    TP 6 5 03/31/2022    TBILI 0 25 03/31/2022    EGFR 106 03/31/2022     Lab Results   Component Value Date    LIPASE 71 (L) 03/31/2022         RUQ ultrasound shows gallbladder polyp will plan for GI follow up    CBC was pending, now lab reports no order, they are looking to see if they have a tube   Will add if able

## 2022-04-01 ENCOUNTER — ROUTINE PRENATAL (OUTPATIENT)
Dept: OBGYN CLINIC | Facility: CLINIC | Age: 47
End: 2022-04-01

## 2022-04-01 VITALS — DIASTOLIC BLOOD PRESSURE: 86 MMHG | WEIGHT: 202.4 LBS | SYSTOLIC BLOOD PRESSURE: 130 MMHG | BODY MASS INDEX: 34.74 KG/M2

## 2022-04-01 DIAGNOSIS — Z3A.25 PREGNANCY WITH 25 COMPLETED WEEKS GESTATION: Primary | ICD-10-CM

## 2022-04-01 DIAGNOSIS — K82.4 GALLBLADDER POLYP: ICD-10-CM

## 2022-04-01 PROCEDURE — PNV: Performed by: OBSTETRICS & GYNECOLOGY

## 2022-04-01 NOTE — PROGRESS NOTES
Patient reports good fm, no n/v, headache, cramping, bleeding, loss of fluid, edema, dom violence, or smoking  jennifer pnv    ON closer exam the RUQ pain is on the rib and musculoskeletal   Will still refer to gi for gallbladder poyp,  Reviewed cbc not done at triage visit but due for 28 week labs, cbc, rpr, cmp urine prot/cr, tsh free t4, return in 3 weeks or sooner as needed, correlated bp cuff today

## 2022-04-19 LAB
ALBUMIN SERPL-MCNC: 3.5 G/DL (ref 3.6–5.1)
ALBUMIN/GLOB SERPL: 1.3 (CALC) (ref 1–2.5)
ALP SERPL-CCNC: 88 U/L (ref 31–125)
ALT SERPL-CCNC: 25 U/L (ref 6–29)
AST SERPL-CCNC: 24 U/L (ref 10–30)
BASOPHILS # BLD AUTO: 49 CELLS/UL (ref 0–200)
BASOPHILS NFR BLD AUTO: 0.5 %
BILIRUB SERPL-MCNC: 0.3 MG/DL (ref 0.2–1.2)
BUN SERPL-MCNC: 7 MG/DL (ref 7–25)
BUN/CREAT SERPL: ABNORMAL (CALC) (ref 6–22)
CALCIUM SERPL-MCNC: 9 MG/DL (ref 8.6–10.2)
CHLORIDE SERPL-SCNC: 105 MMOL/L (ref 98–110)
CO2 SERPL-SCNC: 26 MMOL/L (ref 20–32)
CREAT SERPL-MCNC: 0.68 MG/DL (ref 0.5–1.1)
CREAT UR-MCNC: 20 MG/DL (ref 20–275)
EOSINOPHIL # BLD AUTO: 39 CELLS/UL (ref 15–500)
EOSINOPHIL NFR BLD AUTO: 0.4 %
ERYTHROCYTE [DISTWIDTH] IN BLOOD BY AUTOMATED COUNT: 12.8 % (ref 11–15)
GLOBULIN SER CALC-MCNC: 2.6 G/DL (CALC) (ref 1.9–3.7)
GLUCOSE 1H P 50 G GLC PO SERPL-MCNC: 99 MG/DL
GLUCOSE SERPL-MCNC: 99 MG/DL (ref 65–139)
HCT VFR BLD AUTO: 39.4 % (ref 35–45)
HGB BLD-MCNC: 13.4 G/DL (ref 11.7–15.5)
LYMPHOCYTES # BLD AUTO: 1620 CELLS/UL (ref 850–3900)
LYMPHOCYTES NFR BLD AUTO: 16.7 %
MCH RBC QN AUTO: 28.9 PG (ref 27–33)
MCHC RBC AUTO-ENTMCNC: 34 G/DL (ref 32–36)
MCV RBC AUTO: 85.1 FL (ref 80–100)
MONOCYTES # BLD AUTO: 931 CELLS/UL (ref 200–950)
MONOCYTES NFR BLD AUTO: 9.6 %
NEUTROPHILS # BLD AUTO: 7062 CELLS/UL (ref 1500–7800)
NEUTROPHILS NFR BLD AUTO: 72.8 %
PLATELET # BLD AUTO: 219 THOUSAND/UL (ref 140–400)
PMV BLD REES-ECKER: 10.9 FL (ref 7.5–12.5)
POTASSIUM SERPL-SCNC: 4.1 MMOL/L (ref 3.5–5.3)
PROT SERPL-MCNC: 6.1 G/DL (ref 6.1–8.1)
PROT UR-MCNC: <4 MG/DL (ref 5–24)
PROT/CREAT UR: ABNORMAL MG/G CREAT (ref 21–161)
PROT/CREAT UR: ABNORMAL MG/MG CREAT (ref 0.02–0.16)
RBC # BLD AUTO: 4.63 MILLION/UL (ref 3.8–5.1)
RPR SER QL: NORMAL
SL AMB EGFR AFRICAN AMERICAN: 123 ML/MIN/1.73M2
SL AMB EGFR NON AFRICAN AMERICAN: 106 ML/MIN/1.73M2
SODIUM SERPL-SCNC: 138 MMOL/L (ref 135–146)
T4 FREE SERPL-MCNC: 1.1 NG/DL (ref 0.8–1.8)
TSH SERPL-ACNC: 1.65 MIU/L
WBC # BLD AUTO: 9.7 THOUSAND/UL (ref 3.8–10.8)

## 2022-04-22 ENCOUNTER — ROUTINE PRENATAL (OUTPATIENT)
Dept: OBGYN CLINIC | Facility: CLINIC | Age: 47
End: 2022-04-22

## 2022-04-22 VITALS
WEIGHT: 203 LBS | DIASTOLIC BLOOD PRESSURE: 82 MMHG | BODY MASS INDEX: 34.66 KG/M2 | SYSTOLIC BLOOD PRESSURE: 136 MMHG | HEIGHT: 64 IN

## 2022-04-22 DIAGNOSIS — E03.8 SUBCLINICAL HYPOTHYROIDISM: ICD-10-CM

## 2022-04-22 DIAGNOSIS — O10.919 CHRONIC HYPERTENSION AFFECTING PREGNANCY: ICD-10-CM

## 2022-04-22 DIAGNOSIS — N97.9 INFERTILITY, FEMALE: ICD-10-CM

## 2022-04-22 DIAGNOSIS — Z3A.28 28 WEEKS GESTATION OF PREGNANCY: Primary | ICD-10-CM

## 2022-04-22 PROCEDURE — PNV: Performed by: STUDENT IN AN ORGANIZED HEALTH CARE EDUCATION/TRAINING PROGRAM

## 2022-04-22 NOTE — PROGRESS NOTES
OB/GYN prenatal visit    S: 42 y o  Yemi Concepcion 28w4d here for PN visit  She has no obstetric complaints, including pelvic pain, contractions, vaginal bleeding, loss of fluid   ++movement    O:  Vitals:    04/22/22 1635   BP: 136/82   Gen: no acute distress, nonlabored breathing  Fundal Height (cm): 30 cm  Fetal Heart Rate: 138    A/P:  Breastpump ordered via [facility] today  Discussed pre-term labor precautions and fetal kick counts    Return to office in 2 weeks    Rolando Hughes MD  4/22/2022  4:56 PM

## 2022-04-22 NOTE — PATIENT INSTRUCTIONS
Pregnancy at 32 to 30 Weeks   AMBULATORY CARE:   What changes are happening to your body: You may notice new symptoms such as shortness of breath, heartburn, or swelling of your ankles and feet  You may also have trouble sleeping or contractions  Seek care immediately if:   · You develop a severe headache that does not go away  · You have new or increased vision changes, such as blurred or spotted vision  · You have new or increased swelling in your face or hands  · You have vaginal spotting or bleeding  · Your water broke or you feel warm water gushing or trickling from your vagina  Call your doctor or obstetrician if:   · You have more than 5 contractions in 1 hour  · You notice any changes in your baby's movements  · You have abdominal cramps, pressure, or tightening  · You have a change in vaginal discharge  · You have chills or a fever  · You have vaginal itching, burning, or pain  · You have yellow, green, white, or foul-smelling vaginal discharge  · You have pain or burning when you urinate, less urine than usual, or pink or bloody urine  · You have questions or concerns about your condition or care  How to care for yourself at this stage of your pregnancy:       · Eat a variety of healthy foods  Healthy foods include fruits, vegetables, whole-grain breads, low-fat dairy foods, beans, lean meats, and fish  Drink liquids as directed  Ask how much liquid to drink each day and which liquids are best for you  Limit caffeine to less than 200 milligrams each day  Limit your intake of fish to 2 servings each week  Choose fish low in mercury such as canned light tuna, shrimp, salmon, cod, or tilapia  Do not  eat fish high in mercury such as swordfish, tilefish, ivet mackerel, and shark  · Manage heartburn  by eating 4 or 5 small meals each day instead of large meals  Avoid spicy food  · Manage swelling  by lying down and putting your feet up           · Take prenatal vitamins as directed  Your need for certain vitamins and minerals, such as folic acid, increases during pregnancy  Prenatal vitamins provide some of the extra vitamins and minerals you need  Prenatal vitamins may also help to decrease the risk of certain birth defects  · Talk to your healthcare provider about exercise  Moderate exercise can help you stay fit  Your healthcare provider will help you plan an exercise program that is safe for you during pregnancy  · Do not smoke  Smoking increases your risk of a miscarriage and other health problems during your pregnancy  Smoking can cause your baby to be born too early or weigh less at birth  Ask your healthcare provider for information if you need help quitting  · Do not drink alcohol  Alcohol passes from your body to your baby through the placenta  It can affect your baby's brain development and cause fetal alcohol syndrome (FAS)  FAS is a group of conditions that causes mental, behavior, and growth problems  · Talk to your healthcare provider before you take any medicines  Many medicines may harm your baby if you take them when you are pregnant  Do not take any medicines, vitamins, herbs, or supplements without first talking to your healthcare provider  Never use illegal or street drugs (such as marijuana or cocaine) while you are pregnant  Safety tips during pregnancy:   · Avoid hot tubs and saunas  Do not use a hot tub or sauna while you are pregnant, especially during your first trimester  Hot tubs and saunas may raise your baby's temperature and increase the risk of birth defects  · Avoid toxoplasmosis  This is an infection caused by eating raw meat or being around infected cat feces  It can cause birth defects, miscarriages, and other problems  Wash your hands after you touch raw meat  Make sure any meat is well-cooked before you eat it  Avoid raw eggs and unpasteurized milk   Use gloves or ask someone else to clean your cat's litter box while you are pregnant  Changes that are happening with your baby:  By 30 weeks, your baby may weigh more than 3 pounds  Your baby may be about 11 inches long from the top of the head to the rump (baby's bottom)  Your baby's eyes open and close now  Your baby's kicks and movements are more forceful at this time  What you need to know about prenatal care: Your healthcare provider will check your blood pressure and weight  You may also need the following:  · Blood tests  may be done to check for anemia or blood type  · A urine test  may also be done to check for sugar and protein  These can be signs of gestational diabetes or infection  Protein in your urine may also be a sign of preeclampsia  Preeclampsia is a condition that can develop during week 20 or later of your pregnancy  It causes high blood pressure, and it can cause problems with your kidneys and other organs  · A Tdap vaccine and flu vaccine  may be recommended by your healthcare provider  · A gestational diabetes screen  may be done  Your healthcare provider may order either a 1-step or 2-step oral glucose tolerance test (OGTT)  ? 1-step OGTT:  Your blood sugar level will be tested after you have not eaten for 8 hours (fasting)  You will then be given a glucose drink  Your level will be tested again 1 hour and 2 hours after you finish the drink  ? 2-step OGTT:  You do not have to fast for the first part of the test  You will have the glucose drink at any time of day  Your blood sugar level will be checked 1 hour later  If your blood sugar is higher than a certain level, another test will be ordered  You will fast and your blood sugar level will be tested  You will have the glucose drink  Your blood will be tested again 1 hour, 2 hours, and 3 hours after you finish the glucose drink  · Fundal height  is a measurement of your uterus to check your baby's growth   This number is usually the same as the number of weeks that you have been pregnant  Your healthcare provider may also check your baby's position  · Your baby's heart rate  will be checked  Follow up with your doctor or obstetrician as directed:  Write down your questions so you remember to ask them during your visits  © Copyright [employer] 2022 Information is for End User's use only and may not be sold, redistributed or otherwise used for commercial purposes  All illustrations and images included in CareNotes® are the copyrighted property of [employer]  or [facility] Skye Morley   The above information is an  only  It is not intended as medical advice for individual conditions or treatments  Talk to your doctor, nurse or pharmacist before following any medical regimen to see if it is safe and effective for you

## 2022-04-26 PROBLEM — Z3A.29 29 WEEKS GESTATION OF PREGNANCY: Status: ACTIVE | Noted: 2021-12-30

## 2022-04-27 ENCOUNTER — ULTRASOUND (OUTPATIENT)
Dept: PERINATAL CARE | Facility: CLINIC | Age: 47
End: 2022-04-27
Payer: COMMERCIAL

## 2022-04-27 VITALS
HEART RATE: 88 BPM | BODY MASS INDEX: 34.56 KG/M2 | DIASTOLIC BLOOD PRESSURE: 64 MMHG | HEIGHT: 64 IN | WEIGHT: 202.4 LBS | SYSTOLIC BLOOD PRESSURE: 128 MMHG

## 2022-04-27 DIAGNOSIS — Z36.89 ENCOUNTER FOR ULTRASOUND TO ASSESS FETAL GROWTH: ICD-10-CM

## 2022-04-27 DIAGNOSIS — Z3A.29 29 WEEKS GESTATION OF PREGNANCY: ICD-10-CM

## 2022-04-27 DIAGNOSIS — O09.529 ANTEPARTUM MULTIGRAVIDA OF ADVANCED MATERNAL AGE: Primary | ICD-10-CM

## 2022-04-27 DIAGNOSIS — O10.919 CHRONIC HYPERTENSION AFFECTING PREGNANCY: ICD-10-CM

## 2022-04-27 DIAGNOSIS — Z36.2 ENCOUNTER FOR FOLLOW-UP ULTRASOUND OF FETAL ANATOMY: ICD-10-CM

## 2022-04-27 PROCEDURE — 76816 OB US FOLLOW-UP PER FETUS: CPT | Performed by: OBSTETRICS & GYNECOLOGY

## 2022-04-27 PROCEDURE — 99214 OFFICE O/P EST MOD 30 MIN: CPT | Performed by: OBSTETRICS & GYNECOLOGY

## 2022-04-27 NOTE — PROGRESS NOTES
[address] [address]: Ms Gene Gotti was seen today at 29w2d for fetal growth and followup missed anatomy ultrasound  See ultrasound report under "OB Procedures" tab    Please don't hesitate to contact our office with any concerns or questions   -Deidra Suazo MD

## 2022-05-06 ENCOUNTER — ROUTINE PRENATAL (OUTPATIENT)
Dept: OBGYN CLINIC | Facility: CLINIC | Age: 47
End: 2022-05-06

## 2022-05-06 VITALS
BODY MASS INDEX: 34.66 KG/M2 | WEIGHT: 203 LBS | HEIGHT: 64 IN | SYSTOLIC BLOOD PRESSURE: 158 MMHG | DIASTOLIC BLOOD PRESSURE: 92 MMHG

## 2022-05-06 DIAGNOSIS — R05.9 COUGH: ICD-10-CM

## 2022-05-06 DIAGNOSIS — E03.8 SUBCLINICAL HYPOTHYROIDISM: ICD-10-CM

## 2022-05-06 DIAGNOSIS — Z3A.29 29 WEEKS GESTATION OF PREGNANCY: ICD-10-CM

## 2022-05-06 DIAGNOSIS — N97.9 INFERTILITY, FEMALE: Primary | ICD-10-CM

## 2022-05-06 DIAGNOSIS — O10.919 CHRONIC HYPERTENSION AFFECTING PREGNANCY: ICD-10-CM

## 2022-05-06 PROCEDURE — PNV: Performed by: STUDENT IN AN ORGANIZED HEALTH CARE EDUCATION/TRAINING PROGRAM

## 2022-05-06 RX ORDER — BENZONATATE 100 MG/1
100 CAPSULE ORAL 3 TIMES DAILY PRN
Qty: 20 CAPSULE | Refills: 0 | Status: SHIPPED | OUTPATIENT
Start: 2022-05-06 | End: 2022-06-09

## 2022-05-06 NOTE — PROGRESS NOTES
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  Tessalon Perles were prescribed for cough  Patient reports she did have a headache for 2 days that she attributes to cough and has since resolved  Repeat blood pressure at the end of visit is 128/91  Patient to repeat preeclamptic labs ASAP and plans on keeping log of blood pressures at home- recommended if persistently elevated in the 730R or diastolic in 589Q to please call as we may have to increase her blood pressure medication  We reviewed preeclamptic precautions and she understands she is to call with any concerns  All questions were answered to the best my ability  TDAP at next visit  Return to office in 2 weeks or sooner if needed

## 2022-05-09 LAB
ALBUMIN SERPL-MCNC: 3.5 G/DL (ref 3.6–5.1)
ALBUMIN/GLOB SERPL: 1.3 (CALC) (ref 1–2.5)
ALP SERPL-CCNC: 116 U/L (ref 31–125)
ALT SERPL-CCNC: 18 U/L (ref 6–29)
AST SERPL-CCNC: 18 U/L (ref 10–30)
BILIRUB SERPL-MCNC: 0.4 MG/DL (ref 0.2–1.2)
BUN SERPL-MCNC: 7 MG/DL (ref 7–25)
BUN/CREAT SERPL: ABNORMAL (CALC) (ref 6–22)
CALCIUM SERPL-MCNC: 8.7 MG/DL (ref 8.6–10.2)
CHLORIDE SERPL-SCNC: 107 MMOL/L (ref 98–110)
CO2 SERPL-SCNC: 25 MMOL/L (ref 20–32)
CREAT SERPL-MCNC: 0.71 MG/DL (ref 0.5–1.1)
CREAT UR-MCNC: 149 MG/DL (ref 20–275)
ERYTHROCYTE [DISTWIDTH] IN BLOOD BY AUTOMATED COUNT: 13 % (ref 11–15)
GLOBULIN SER CALC-MCNC: 2.6 G/DL (CALC) (ref 1.9–3.7)
GLUCOSE SERPL-MCNC: 91 MG/DL (ref 65–99)
HCT VFR BLD AUTO: 38.2 % (ref 35–45)
HGB BLD-MCNC: 13.1 G/DL (ref 11.7–15.5)
MCH RBC QN AUTO: 29.2 PG (ref 27–33)
MCHC RBC AUTO-ENTMCNC: 34.3 G/DL (ref 32–36)
MCV RBC AUTO: 85.1 FL (ref 80–100)
PLATELET # BLD AUTO: 187 THOUSAND/UL (ref 140–400)
PMV BLD REES-ECKER: 10.2 FL (ref 7.5–12.5)
POTASSIUM SERPL-SCNC: 3.8 MMOL/L (ref 3.5–5.3)
PROT SERPL-MCNC: 6.1 G/DL (ref 6.1–8.1)
PROT UR-MCNC: 16 MG/DL (ref 5–24)
PROT/CREAT UR: 0.11 MG/MG CREAT (ref 0.02–0.16)
PROT/CREAT UR: 107 MG/G CREAT (ref 21–161)
RBC # BLD AUTO: 4.49 MILLION/UL (ref 3.8–5.1)
SL AMB EGFR AFRICAN AMERICAN: 120 ML/MIN/1.73M2
SL AMB EGFR NON AFRICAN AMERICAN: 104 ML/MIN/1.73M2
SODIUM SERPL-SCNC: 139 MMOL/L (ref 135–146)
WBC # BLD AUTO: 7.8 THOUSAND/UL (ref 3.8–10.8)

## 2022-05-20 ENCOUNTER — ULTRASOUND (OUTPATIENT)
Dept: PERINATAL CARE | Facility: CLINIC | Age: 47
End: 2022-05-20
Payer: COMMERCIAL

## 2022-05-20 ENCOUNTER — ROUTINE PRENATAL (OUTPATIENT)
Dept: OBGYN CLINIC | Facility: CLINIC | Age: 47
End: 2022-05-20

## 2022-05-20 VITALS
WEIGHT: 200.8 LBS | DIASTOLIC BLOOD PRESSURE: 58 MMHG | HEIGHT: 64 IN | BODY MASS INDEX: 34.28 KG/M2 | SYSTOLIC BLOOD PRESSURE: 110 MMHG | HEART RATE: 84 BPM

## 2022-05-20 VITALS
WEIGHT: 200.8 LBS | SYSTOLIC BLOOD PRESSURE: 112 MMHG | HEIGHT: 64 IN | BODY MASS INDEX: 34.28 KG/M2 | DIASTOLIC BLOOD PRESSURE: 72 MMHG

## 2022-05-20 DIAGNOSIS — Z34.03 ENCOUNTER FOR SUPERVISION OF NORMAL FIRST PREGNANCY IN THIRD TRIMESTER: ICD-10-CM

## 2022-05-20 DIAGNOSIS — E03.8 SUBCLINICAL HYPOTHYROIDISM: ICD-10-CM

## 2022-05-20 DIAGNOSIS — O10.919 CHRONIC HYPERTENSION AFFECTING PREGNANCY: Primary | ICD-10-CM

## 2022-05-20 DIAGNOSIS — O09.529 ANTEPARTUM MULTIGRAVIDA OF ADVANCED MATERNAL AGE: ICD-10-CM

## 2022-05-20 DIAGNOSIS — N97.9 INFERTILITY, FEMALE: ICD-10-CM

## 2022-05-20 DIAGNOSIS — Z3A.32 32 WEEKS GESTATION OF PREGNANCY: Primary | ICD-10-CM

## 2022-05-20 DIAGNOSIS — Z3A.32 32 WEEKS GESTATION OF PREGNANCY: ICD-10-CM

## 2022-05-20 DIAGNOSIS — O09.523 MULTIGRAVIDA OF ADVANCED MATERNAL AGE IN THIRD TRIMESTER: ICD-10-CM

## 2022-05-20 PROCEDURE — 59025 FETAL NON-STRESS TEST: CPT | Performed by: OBSTETRICS & GYNECOLOGY

## 2022-05-20 PROCEDURE — PNV: Performed by: STUDENT IN AN ORGANIZED HEALTH CARE EDUCATION/TRAINING PROGRAM

## 2022-05-20 PROCEDURE — 76815 OB US LIMITED FETUS(S): CPT | Performed by: OBSTETRICS & GYNECOLOGY

## 2022-05-20 NOTE — PATIENT INSTRUCTIONS
Pregnancy at 31 to 34 Weeks   AMBULATORY CARE:   Changes happening with your body: You may continue to have symptoms such as shortness of breath, heartburn, contractions, or swelling of your ankles and feet  You may be gaining about 1 pound a week now  Seek care immediately if:   You develop a severe headache that does not go away  You have new or increased vision changes, such as blurred or spotted vision  You have new or increased swelling in your face or hands  You have vaginal spotting or bleeding  Your water broke or you feel warm water gushing or trickling from your vagina  Call your obstetrician if:   You have more than 5 contractions in 1 hour  You notice any changes in your baby's movements  You have abdominal cramps, pressure, or tightening  You have a change in vaginal discharge  You have chills or a fever  You have vaginal itching, burning, or pain  You have yellow, green, white, or foul-smelling vaginal discharge  You have pain or burning when you urinate, less urine than usual, or pink or bloody urine  You have questions or concerns about your condition or care  How to care for yourself at this stage of your pregnancy:       Eat a variety of healthy foods  Healthy foods include fruits, vegetables, whole-grain breads, low-fat dairy foods, beans, lean meats, and fish  Drink liquids as directed  Ask how much liquid to drink each day and which liquids are best for you  Limit caffeine to less than 200 milligrams each day  Limit your intake of fish to 2 servings each week  Choose fish low in mercury such as canned light tuna, shrimp, salmon, cod, or tilapia  Do not  eat fish high in mercury such as swordfish, tilefish, ivet mackerel, and shark  Manage heartburn  by eating 4 or 5 small meals each day instead of large meals  Avoid spicy food  Manage swelling  by lying down and putting your feet up  Take prenatal vitamins as directed    Your need for certain vitamins and minerals, such as folic acid, increases during pregnancy  Prenatal vitamins provide some of the extra vitamins and minerals you need  Prenatal vitamins may also help to decrease the risk of certain birth defects  Talk to your healthcare provider about exercise  Moderate exercise can help you stay fit  Your healthcare provider will help you plan an exercise program that is safe for you during pregnancy  Do not smoke  Smoking increases your risk of a miscarriage and other health problems during your pregnancy  Smoking can cause your baby to be born too early or weigh less at birth  Ask your healthcare provider for information if you need help quitting  Do not drink alcohol  Alcohol passes from your body to your baby through the placenta  It can affect your baby's brain development and cause fetal alcohol syndrome (FAS)  FAS is a group of conditions that causes mental, behavior, and growth problems  Talk to your healthcare provider before you take any medicines  Many medicines may harm your baby if you take them when you are pregnant  Do not take any medicines, vitamins, herbs, or supplements without first talking to your healthcare provider  Never use illegal or street drugs (such as marijuana or cocaine) while you are pregnant  Safety tips during pregnancy:   Avoid hot tubs and saunas  Do not use a hot tub or sauna while you are pregnant, especially during your first trimester  Hot tubs and saunas may raise your baby's temperature and increase the risk of birth defects  Avoid toxoplasmosis  This is an infection caused by eating raw meat or being around infected cat feces  It can cause birth defects, miscarriages, and other problems  Wash your hands after you touch raw meat  Make sure any meat is well-cooked before you eat it  Avoid raw eggs and unpasteurized milk  Use gloves or ask someone else to clean your cat's litter box while you are pregnant  Changes happening with your baby:  By 34 weeks, your baby may weigh more than 5 pounds  Your baby will be about 12 ½ inches long from the top of the head to the rump (baby's bottom)  Your baby is gaining about ½ pound a week  Your baby's eyes open and close now  Your baby's kicks and movements are more forceful at this time  What you need to know about prenatal care: Your healthcare provider will check your blood pressure and weight  You may also need the following:  A urine test  may also be done to check for sugar and protein  These can be signs of gestational diabetes or infection  Protein in your urine may also be a sign of preeclampsia  Preeclampsia is a condition that can develop during week 20 or later of your pregnancy  It causes high blood pressure, and it can cause problems with your kidneys and other organs  A gestational diabetes screen  may be done  Your healthcare provider may order either a 1-step or 2-step oral glucose tolerance test (OGTT)  1-step OGTT:  Your blood sugar level will be tested after you have not eaten for 8 hours (fasting)  You will then be given a glucose drink  Your level will be tested again 1 hour and 2 hours after you finish the drink  2-step OGTT:  You do not have to fast for the first part of the test  You will have the glucose drink at any time of day  Your blood sugar level will be checked 1 hour later  If your blood sugar is higher than a certain level, another test will be ordered  You will fast and your blood sugar level will be tested  You will have the glucose drink  Your blood will be tested again 1 hour, 2 hours, and 3 hours after you finish the glucose drink  A Tdap vaccine  may be recommended by your healthcare provider  Fundal height  is a measurement of your uterus to check your baby's growth  This number is usually the same as the number of weeks that you have been pregnant   Your healthcare provider may also check your baby's position  Your baby's heart rate  will be checked  Follow up with your obstetrician as directed:  Write down your questions so you remember to ask them during your visits  © Copyright [employer] 2022 Information is for End User's use only and may not be sold, redistributed or otherwise used for commercial purposes  All illustrations and images included in CareNotes® are the copyrighted property of A D A M , Inc  or [employer]   The above information is an  only  It is not intended as medical advice for individual conditions or treatments  Talk to your doctor, nurse or pharmacist before following any medical regimen to see if it is safe and effective for you

## 2022-05-20 NOTE — PROGRESS NOTES
Non-Stress Testing:    Non-Stress test, equipment, procedure, and expected outcomes reviewed  Reviewed fetal kick counts and when to call OB  Lee Nathan Verified patient understanding of fetal kick counts with teach back method  Patient reports feeling daily fetal movements  Patient has no questions or concerns

## 2022-05-20 NOTE — LETTER
NST sleeve cover sheet    Patient name: Yue Dunn  : 1975  MRN: [id number]    HELGA: Estimated Date of Delivery: 22    Obstetrician: [facility]       Reason(s) for testing: SYEDTN      Testing frequency:    ___ 2x/wk  __X_ 1x/wk  ___ Dopplers  ___ BPP?       Last growth scan: __________________________________________

## 2022-05-20 NOTE — PROGRESS NOTES
42 y o  Jacquelyn Cabot  Reports ++FM, no LOF, VB, or contractions      Vitals:    05/20/22 1409   BP: 112/72   Gen: no acute distress, nonlabored breathing  Fundal Height (cm): 32 cm  Fetal Heart Rate: 152    A/P:  NST today reactive  Would like tdap today  Discussed pre-term labor precautions and fetal kick counts    Return to office in 2 weeks    Kourtney Hannon MD  5/20/2022  2:21 PM

## 2022-05-20 NOTE — LETTER
May 20, 2022     [facility]    Patient: Jean Pierre Cm   YOB: 1975   Date of Visit: 5/20/2022       Dear Dr Renu Garcia: Thank you for referring Wendi Hanna to me for evaluation  Below are my notes for this consultation  If you have questions, please do not hesitate to call me  I look forward to following your patient along with you  Sincerely,        Joaquina Herrmann MD        CC: No Recipients  Joaquina Herrmann MD  5/20/2022  4:42 PM  Sign when Signing Visit  NST is reactive   Joaquina Herrmann MD

## 2022-05-20 NOTE — PATIENT INSTRUCTIONS
Nonstress Test for Pregnancy   WHAT YOU NEED TO KNOW:   What do I need to know about a nonstress test?  A nonstress test measures your baby's heart rate and movements  Nonstress means that no stress will be placed on your baby during the test   How do I prepare for a nonstress test?  Your healthcare provider will talk to you about how to prepare for this test  He or she may tell you to eat and drink plenty of fluids before your test  If you smoke, you may be asked not to smoke within 2 hours before the test  He or she will also tell you which medicines to take or not take on the day of your test   What will happen during a nonstress test?  You may be asked to lie down or recline back for the test on a bed  One or 2 belts with sensors will be placed around your abdomen  Your baby's heart rate will be recorded with a machine  If your baby does not move, your baby may be asleep  Your healthcare provider may make a noise near your abdomen to try to wake your baby  The test usually takes about 20 minutes, but can take longer if your baby needs to be awakened  What do I need to know about the test results? Your baby will be expected to move at least 2 times for a certain amount of time  Your baby's heart rate will be expected to go up by a certain number of beats per minute during movement  If your baby does not move as expected, the test may need to be repeated or you may need other tests  CARE AGREEMENT:   You have the right to help plan your care  Learn about your health condition and how it may be treated  Discuss treatment options with your healthcare providers to decide what care you want to receive  You always have the right to refuse treatment  The above information is an  only  It is not intended as medical advice for individual conditions or treatments  Talk to your doctor, nurse or pharmacist before following any medical regimen to see if it is safe and effective for you    © Copyright [employer] 2022 Information is for [employer] use only and may not be sold, redistributed or otherwise used for commercial purposes  All illustrations and images included in CareNotes® are the copyrighted property of [employer]  or [address] [place] [address] Hieu Echevarria in Pregnancy   AMBULATORY CARE:   Kick counts  measure how much your baby is moving in your womb  A kick from your baby can be felt as a twist, turn, swish, roll, or jab  It is common to feel your baby kicking at 26 to 28 weeks of pregnancy  You may feel your baby kick as early as 20 weeks of pregnancy  You may want to start counting at 28 weeks  Contact your doctor immediately if:   You feel a change in the number of kicks or movements of your baby  You feel fewer than 10 kicks within 2 hours  You have questions or concerns about your baby's movements  Why measure kick counts:  Your baby's movement may provide information about your baby's health  He or she may move less, or not at all, if there are problems  Your baby may move less if he or she is not getting enough oxygen or nutrition from the placenta  Do not smoke while you are pregnant  Smoking decreases the amount of oxygen that gets to your baby  Talk to your healthcare provider if you need help to quit smoking  Tell your healthcare provider as soon as you feel a change in your baby's movements  When to measure kick counts:   Measure kick counts at the same time every day  Measure kick counts when your baby is awake and most active  Your baby may be most active in the evening  How to measure kick counts:  Check that your baby is awake before you measure kick counts  You can wake up your baby by lightly pushing on your belly, walking, or drinking something cold  Your healthcare provider may tell you different ways to measure kick counts  You may be told to do the following:  Use a chart or clock to keep track of the time you start and finish counting       Sit in a chair or lie on your left side  Place your hands on the largest part of your belly  Count until you reach 10 kicks  Write down how much time it takes to count 10 kicks  It may take 30 minutes to 2 hours to count 10 kicks  It should not take more than 2 hours to count 10 kicks  Follow up with your doctor as directed:  Write down your questions so you remember to ask them during your visits  © Copyright [employer] 2022 Information is for End User's use only and may not be sold, redistributed or otherwise used for commercial purposes  All illustrations and images included in CareNotes® are the copyrighted property of A D A M , Inc  or [facility] Skye Morley   The above information is an  only  It is not intended as medical advice for individual conditions or treatments  Talk to your doctor, nurse or pharmacist before following any medical regimen to see if it is safe and effective for you

## 2022-05-21 PROBLEM — O09.513 AMA (ADVANCED MATERNAL AGE) PRIMIGRAVIDA 35+, THIRD TRIMESTER: Status: ACTIVE | Noted: 2022-01-04

## 2022-05-21 NOTE — PATIENT INSTRUCTIONS
Thank you for choosing us for your  care today  If you have any questions about your ultrasound or care, please do not hesitate to contact us or your primary obstetrician  Some general instructions for your pregnancy are:    Protect against coronavirus: get vaccinated - pregnant women are increased risk of severe COVID  Notify your primary care doctor if you have any symptoms  Exercise: Aim for 22 minutes per day (150 minutes per week) of regular exercise  Walking is great! Nutrition: aim for calcium-rich and iron-rich foods as well as healthy sources of protein  Learn about Preeclampsia: preeclampsia is a common, serious high blood pressure complication in pregnancy  A blood pressure of 940CZNB (systolic or top number) or 66UZOZ (diastolic or bottom number) is not normal and needs evaluation by your doctor  Aspirin is sometimes prescribed in early pregnancy to prevent preeclampsia in women with risk factors - ask your obstetrician if you should be on this medication  If you smoke, try to reduce how many cigarettes you smoke or try to quit completely  Do not vape  Other warning signs to watch out for in pregnancy or postpartum: chest pain, obstructed breathing or shortness of breath, seizures, thoughts of hurting yourself or your baby, bleeding, a painful or swollen leg, fever, or headache (see [facility] POST-BIRTH Warning Signs campaign)  If these happen call 911  Itching is also not normal in pregnancy and if you experience this, especially over your hands and feet, potentially worse at night, notify your doctors  Kick Counts in Pregnancy   AMBULATORY CARE:   Kick counts  measure how much your baby is moving in your womb  A kick from your baby can be felt as a twist, turn, swish, roll, or jab  It is common to feel your baby kicking at 26 to 28 weeks of pregnancy  You may feel your baby kick as early as 20 weeks of pregnancy  You may want to start counting at 28 weeks     Contact your doctor immediately if:   You feel a change in the number of kicks or movements of your baby  You feel fewer than 10 kicks within 2 hours  You have questions or concerns about your baby's movements  Why measure kick counts:  Your baby's movement may provide information about your baby's health  He or she may move less, or not at all, if there are problems  Your baby may move less if he or she is not getting enough oxygen or nutrition from the placenta  Do not smoke while you are pregnant  Smoking decreases the amount of oxygen that gets to your baby  Talk to your healthcare provider if you need help to quit smoking  Tell your healthcare provider as soon as you feel a change in your baby's movements  When to measure kick counts:   Measure kick counts at the same time every day  Measure kick counts when your baby is awake and most active  Your baby may be most active in the evening  How to measure kick counts:  Check that your baby is awake before you measure kick counts  You can wake up your baby by lightly pushing on your belly, walking, or drinking something cold  Your healthcare provider may tell you different ways to measure kick counts  You may be told to do the following:  Use a chart or clock to keep track of the time you start and finish counting  Sit in a chair or lie on your left side  Place your hands on the largest part of your belly  Count until you reach 10 kicks  Write down how much time it takes to count 10 kicks  It may take 30 minutes to 2 hours to count 10 kicks  It should not take more than 2 hours to count 10 kicks  Follow up with your doctor as directed:  Write down your questions so you remember to ask them during your visits  © Copyright [employer] 2022 Information is for End User's use only and may not be sold, redistributed or otherwise used for commercial purposes   All illustrations and images included in CareNotes® are the copyrighted property of A [employer] M [employer] Inc  or [facility] Skye Morley   The above information is an  only  It is not intended as medical advice for individual conditions or treatments  Talk to your doctor, nurse or pharmacist before following any medical regimen to see if it is safe and effective for you

## 2022-05-24 DIAGNOSIS — E03.8 SUBCLINICAL HYPOTHYROIDISM: ICD-10-CM

## 2022-05-24 RX ORDER — LEVOTHYROXINE SODIUM 0.03 MG/1
TABLET ORAL
Qty: 30 TABLET | Refills: 2 | Status: SHIPPED | OUTPATIENT
Start: 2022-05-24

## 2022-05-26 ENCOUNTER — ULTRASOUND (OUTPATIENT)
Dept: PERINATAL CARE | Facility: CLINIC | Age: 47
End: 2022-05-26
Payer: COMMERCIAL

## 2022-05-26 VITALS
HEART RATE: 104 BPM | WEIGHT: 205 LBS | DIASTOLIC BLOOD PRESSURE: 82 MMHG | HEIGHT: 64 IN | BODY MASS INDEX: 35 KG/M2 | SYSTOLIC BLOOD PRESSURE: 122 MMHG

## 2022-05-26 DIAGNOSIS — O99.210 OBESITY IN PREGNANCY, ANTEPARTUM: ICD-10-CM

## 2022-05-26 DIAGNOSIS — Z3A.33 33 WEEKS GESTATION OF PREGNANCY: ICD-10-CM

## 2022-05-26 DIAGNOSIS — O09.513 AMA (ADVANCED MATERNAL AGE) PRIMIGRAVIDA 35+, THIRD TRIMESTER: Primary | ICD-10-CM

## 2022-05-26 DIAGNOSIS — O10.919 CHRONIC HYPERTENSION AFFECTING PREGNANCY: ICD-10-CM

## 2022-05-26 PROCEDURE — 76818 FETAL BIOPHYS PROFILE W/NST: CPT | Performed by: OBSTETRICS & GYNECOLOGY

## 2022-05-26 PROCEDURE — 99213 OFFICE O/P EST LOW 20 MIN: CPT | Performed by: NURSE PRACTITIONER

## 2022-05-26 NOTE — PROGRESS NOTES
[id number] [facility]: Ms Cody Severin was seen today for NST (found under the pregnancy episode) which I reviewed the RN assessment and agree, and CUBA (see ultrasound report under OB procedures tab)  See ultrasound report under "OB Procedures" tab  Non-reactive NST with normal BPP (8/8)  Patient concerned  Discussed sleep/wake cycles and BPP is very reassuring  All questions answered   Please don't hesitate to contact our office with any concerns or questions   -TRENT Garza

## 2022-05-27 ENCOUNTER — CONSULT (OUTPATIENT)
Dept: GASTROENTEROLOGY | Facility: AMBULARY SURGERY CENTER | Age: 47
End: 2022-05-27
Attending: OBSTETRICS & GYNECOLOGY
Payer: COMMERCIAL

## 2022-05-27 VITALS
SYSTOLIC BLOOD PRESSURE: 138 MMHG | HEIGHT: 64 IN | DIASTOLIC BLOOD PRESSURE: 82 MMHG | BODY MASS INDEX: 35 KG/M2 | OXYGEN SATURATION: 99 % | WEIGHT: 205 LBS | HEART RATE: 102 BPM

## 2022-05-27 DIAGNOSIS — K82.4 GALLBLADDER POLYP: ICD-10-CM

## 2022-05-27 DIAGNOSIS — R10.10 UPPER ABDOMINAL PAIN: Primary | ICD-10-CM

## 2022-05-27 DIAGNOSIS — K21.9 GASTROESOPHAGEAL REFLUX DISEASE, UNSPECIFIED WHETHER ESOPHAGITIS PRESENT: ICD-10-CM

## 2022-05-27 PROCEDURE — 99244 OFF/OP CNSLTJ NEW/EST MOD 40: CPT | Performed by: INTERNAL MEDICINE

## 2022-05-27 NOTE — LETTER
May 27, 2022     [facility]    Patient: Danielle Stephenson   YOB: 1975   Date of Visit: 5/27/2022       Dear Dr Gwen Luz: Thank you for referring Jennifer Green to me for evaluation  Below are my notes for this consultation  If you have questions, please do not hesitate to call me  I look forward to following your patient along with you  Sincerely,        Adriel Ba MD        CC: MD Adriel Borges MD  5/27/2022  5:32 PM  Incomplete  Consultation - [facility]  Bibi BurkettArvindkemal 42 y o  female MRN: [id number]  Unit/Bed#:  Encounter: [id number]        Consults    ASSESSMENT/PLAN:       1  Point tenderness in the epigastric region-possibly musculoskeletal, right upper quadrant ultrasound did not show any evidence of gallstones, sludge or choledocholithiasis  There was a 6 mm polyp however do not suspect this is causing her symptoms  Her symptoms are predominantly in the epigastric region  May possibly have a small ventral wall hernia that may be causing her symptoms, may not be unreasonable to obtain an upper abdominal ultrasound   -would also optimize the treatment of acid reflux, would recommend to take Pepcid before dinner as well, can take OTC Pepcid before dinner as well  Avoid eating late at night, avoid greasy foods, caffeine, carbonated drinks, acidic foods, avoid eating at least 4 hours before bedtime  -patient is agreeable to this   -will check liver enzymes  again  -lipase was normal previously  2  Constipation-continue Colace as recommended by Ob, this seems to be working      3  GERD-symptoms seem to worsen the evenings, likely due to inadequate acid suppression and also due to the late at night, would recommend eating earlier   -add Pepcid before dinner as well   -would hold off on endoscopic evaluation at this time given patient is in her 34th week of pregnancy   -if patient has persistent symptoms of acid reflux after pregnancy, can consider EGD after delivery  ______________________________________________________________________    Reason for Consult / Principal Problem: [unfilled]    HPI: Misael Blunt is a 42y o  year old female with history of hypertension, GERD, 34 weeks pregnant, presents for evaluation of focal tenderness in the epigastric region, which is constant, no significant relationship to food intake  She has cut down on green leafy salads which has helped  She has also started taking Colace for constipation which has also helped  This is been ongoing for the past 6 weeks  She reports that she has been taking Pepcid 10 mg b i d  Before breakfast and before lunch which helps control the acid reflux symptoms however reports that her acid reflux symptoms to return by the evening  She does admit that she is eating late at night as she is working during the day and does not eat her dinner until late  She does also report burning sensation in the chest after eating  Recently underwent blood work which showed normal liver enzymes, normal hemoglobin and platelets  She underwent right upper quadrant ultrasound at end of March which was notable only for 6 mm gallbladder polyp, no evidence of gallstones or sludge  There was no evidence of choledocholithiasis  There was a 2 cm hepatic cyst as well  Review of Systems: The remainder of the review of systems was negative except for the pertinent positives noted in HPI  Historical Information   Past Medical History:   Diagnosis Date    Abnormal Pap smear of cervix     in 30's       Anxiety     Environmental allergies     GERD (gastroesophageal reflux disease)     Hypertension     PONV (postoperative nausea and vomiting)     Sinus infection     chronic     Past Surgical History:   Procedure Laterality Date    HAND SURGERY Left     ganglion cyst    SD LARYNGOSCOPY,DIRECT,SCOPE,INJ CORDS N/A 4/10/2017    Procedure: MICROLARYNGOSCOPY BILATERAL VOCAL FOLD INJECTION (DECADRON); Surgeon: Petr Ghosh MD;  [facility]: [facility];  Service: ENT    REFRACTIVE SURGERY  2010    WISDOM TOOTH EXTRACTION       Social History   Social History     Substance and Sexual Activity   Alcohol Use Not Currently    Comment: socially 1-2 drinks/wk     Social History     Substance and Sexual Activity   Drug Use No     Social History     Tobacco Use   Smoking Status Never Smoker   Smokeless Tobacco Never Used     Family History   Problem Relation Age of Onset   Mark Wooten Breast cancer Mother 48    Hypertension Father     Diabetes type I Brother     Diabetes Maternal Grandmother     Diabetes Paternal Grandfather        Meds/Allergies     (Not in a hospital admission)    No current facility-administered medications for this visit  Allergies   Allergen Reactions    Other      Seasonal         Objective     Blood pressure 138/82, pulse 102, height 5' 4" (1 626 m), weight 93 kg (205 lb), last menstrual period 10/07/2021, SpO2 99 %  [unfilled]    PHYSICAL EXAM     GEN: well nourished, well developed, no acute distress  HEENT: anicteric, MMM, no cervical or supraclavicular lymphadenopathy  CV: RRR, no m/r/g  CHEST: CTA b/l, no WRR  ABD: +BS, soft, NT/ND, no hepatosplenomegaly  EXT: no c/c/e  SKIN: no rashes,  NEURO: aaox3    Lab Results:   No visits with results within 1 Day(s) from this visit     Latest known visit with results is:   Orders Only on 05/09/2022   Component Date Value    Glucose, Random 05/09/2022 91     BUN 05/09/2022 7     Creatinine 05/09/2022 0 71     eGFR Non  05/09/2022 104     eGFR  05/09/2022 120     SL AMB BUN/CREATININE RA* 68/16/3717 NOT APPLICABLE     Sodium 14/22/8163 139     Potassium 05/09/2022 3 8     Chloride 05/09/2022 107     CO2 05/09/2022 25     Calcium 05/09/2022 8 7     Protein, Total 05/09/2022 6 1     Albumin 05/09/2022 3 5 (A)    Globulin 05/09/2022 2 6     Albumin/Globulin Ratio 05/09/2022 1 3     TOTAL BILIRUBIN 05/09/2022 0 4     Alkaline Phosphatase 05/09/2022 116     AST 05/09/2022 18     ALT 05/09/2022 18     White Blood Cell Count 05/09/2022 7 8     Red Blood Cell Count 05/09/2022 4 49     Hemoglobin 05/09/2022 13 1     HCT 05/09/2022 38 2     MCV 05/09/2022 85 1     MCH 05/09/2022 29 2     MCHC 05/09/2022 34 3     RDW 05/09/2022 13 0     Platelet Count 29/31/6816 187     SL AMB MPV 05/09/2022 10 2     Creatinine, Urine 05/09/2022 149     Protein/Creat Ratio 05/09/2022 107     Protein/Creat Ratio 05/09/2022 0 107     Total Protein, Urine 05/09/2022 16      Imaging Studies: I have personally reviewed pertinent films in PACS                  Dian Galeana MD  5/27/2022  5:31 PM  Sign when Signing Visit  Consultation - [facility]  Chuck Reddy 42 y o  female MRN: [id number]  Unit/Bed#:  Encounter: [id number]        Consults    ASSESSMENT/PLAN:       1  Point tenderness in the epigastric region-possibly musculoskeletal, right upper quadrant ultrasound did not show any evidence of gallstones, sludge or choledocholithiasis  There was a 6 mm polyp however do not suspect this is causing her symptoms  Her symptoms are predominantly in the epigastric region  May possibly have a small ventral wall hernia that may be causing her symptoms, may not be unreasonable to obtain an upper abdominal ultrasound   -would also optimize the treatment of acid reflux, would recommend to take Pepcid before dinner as well, can take OTC Pepcid before dinner as well  Avoid eating late at night, avoid greasy foods, caffeine, carbonated drinks, acidic foods, avoid eating at least 4 hours before bedtime  -patient is agreeable to this   -will check liver enzymes and CBC again  -lipase was normal previously      2  Constipation-continue Colace as recommended by Ob, this seems to be working  3  GERD-symptoms seem to worsen the evenings, likely due to inadequate acid suppression and also due to the late at night, would recommend eating earlier   -add Pepcid before dinner as well   -would hold off on endoscopic evaluation at this time given patient is in her 34th week of pregnancy   -if patient has persistent symptoms of acid reflux after pregnancy, can consider EGD after delivery  ______________________________________________________________________    Reason for Consult / Principal Problem: [unfilled]    HPI: Elisa Shafer is a 42y o  year old female with history of hypertension, GERD, 34 weeks pregnant, presents for evaluation of focal tenderness in the epigastric region, which is constant, no significant relationship to food intake  She has cut down on green leafy salads which has helped  She has also started taking Colace for constipation which has also helped  This is been ongoing for the past 6 weeks  She reports that she has been taking Pepcid 10 mg b i d  Before breakfast and before lunch which helps control the acid reflux symptoms however reports that her acid reflux symptoms to return by the evening  She does admit that she is eating late at night as she is working during the day and does not eat her dinner until late  She does also report burning sensation in the chest after eating  Recently underwent blood work which showed normal liver enzymes, normal hemoglobin and platelets  She underwent right upper quadrant ultrasound at end of March which was notable only for 6 mm gallbladder polyp, no evidence of gallstones or sludge  There was no evidence of choledocholithiasis  There was a 2 cm hepatic cyst as well  Review of Systems: The remainder of the review of systems was negative except for the pertinent positives noted in HPI       Historical Information   Past Medical History:   Diagnosis Date    Abnormal Pap smear of cervix in 30's   Anxiety     Environmental allergies     GERD (gastroesophageal reflux disease)     Hypertension     PONV (postoperative nausea and vomiting)     Sinus infection     chronic     Past Surgical History:   Procedure Laterality Date    HAND SURGERY Left     ganglion cyst    [place] LARYNGOSCOPY,DIRECT,SCOPE,INJ CORDS N/A 4/10/2017    Procedure: MICROLARYNGOSCOPY BILATERAL VOCAL FOLD INJECTION (DECADRON); Surgeon: Rikki Cuevas MD;  [facility];  Service: ENT    REFRACTIVE SURGERY  2010    WISDOM TOOTH EXTRACTION       Social History   Social History     Substance and Sexual Activity   Alcohol Use Not Currently    Comment: socially 1-2 drinks/wk     Social History     Substance and Sexual Activity   Drug Use No     Social History     Tobacco Use   Smoking Status Never Smoker   Smokeless Tobacco Never Used     Family History   Problem Relation Age of Onset   Nena Stewart Breast cancer Mother 48    Hypertension Father     Diabetes type I Brother     Diabetes Maternal Grandmother     Diabetes Paternal Grandfather        Meds/Allergies     (Not in a hospital admission)    No current facility-administered medications for this visit  Allergies   Allergen Reactions    Other      Seasonal         Objective     Blood pressure 138/82, pulse 102, height 5' 4" (1 626 m), weight 93 kg (205 lb), last menstrual period 10/07/2021, SpO2 99 %  [unfilled]    PHYSICAL EXAM     GEN: well nourished, well developed, no acute distress  HEENT: anicteric, MMM, no cervical or supraclavicular lymphadenopathy  CV: RRR, no m/r/g  CHEST: CTA b/l, no WRR  ABD: +BS, soft, NT/ND, no hepatosplenomegaly  EXT: no c/c/e  SKIN: no rashes,  NEURO: aaox3    Lab Results:   No visits with results within 1 Day(s) from this visit     Latest known visit with results is:   Orders Only on 05/09/2022   Component Date Value    Glucose, Random 05/09/2022 91     BUN 05/09/2022 7     Creatinine 05/09/2022 0 71     eGFR Non  05/09/2022 104     eGFR  05/09/2022 120     SL AMB BUN/CREATININE RA* 16/93/5691 NOT APPLICABLE     Sodium 56/87/5091 139     Potassium 05/09/2022 3 8     Chloride 05/09/2022 107     CO2 05/09/2022 25     Calcium 05/09/2022 8 7     Protein, Total 05/09/2022 6 1     Albumin 05/09/2022 3 5 (A)    Globulin 05/09/2022 2 6     Albumin/Globulin Ratio 05/09/2022 1 3     TOTAL BILIRUBIN 05/09/2022 0 4     Alkaline Phosphatase 05/09/2022 116     AST 05/09/2022 18     ALT 05/09/2022 18     White Blood Cell Count 05/09/2022 7 8     Red Blood Cell Count 05/09/2022 4 49     Hemoglobin 05/09/2022 13 1     HCT 05/09/2022 38 2     MCV 05/09/2022 85 1     MCH 05/09/2022 29 2     MCHC 05/09/2022 34 3     RDW 05/09/2022 13 0     Platelet Count 83/31/4438 187     SL AMB MPV 05/09/2022 10 2     Creatinine, Urine 05/09/2022 149     Protein/Creat Ratio 05/09/2022 107     Protein/Creat Ratio 05/09/2022 0 107     Total Protein, Urine 05/09/2022 16      Imaging Studies: I have personally reviewed pertinent films in PACS

## 2022-05-27 NOTE — PROGRESS NOTES
Consultation - [address] [facility]  Cecile Maricruz Crawfordkemal 42 y o  female MRN: [id number]  Unit/Bed#:  Encounter: [id number]        Consults    ASSESSMENT/PLAN:       1  Point tenderness in the epigastric region-possibly musculoskeletal, right upper quadrant ultrasound did not show any evidence of gallstones, sludge or choledocholithiasis  There was a 6 mm polyp however do not suspect this is causing her symptoms  Her symptoms are predominantly in the epigastric region  May possibly have a small ventral wall hernia that may be causing her symptoms, may not be unreasonable to obtain an upper abdominal ultrasound   -would also optimize the treatment of acid reflux, would recommend to take Pepcid before dinner as well, can take OTC Pepcid before dinner as well  Avoid eating late at night, avoid greasy foods, caffeine, carbonated drinks, acidic foods, avoid eating at least 4 hours before bedtime  -patient is agreeable to this   -will check liver enzymes  again  -lipase was normal previously  2  Constipation-continue Colace as recommended by Ob, this seems to be working  3  GERD-symptoms seem to worsen the evenings, likely due to inadequate acid suppression and also due to the late at night, would recommend eating earlier   -add Pepcid before dinner as well   -would hold off on endoscopic evaluation at this time given patient is in her 34th week of pregnancy   -if patient has persistent symptoms of acid reflux after pregnancy, can consider EGD after delivery  ______________________________________________________________________    Reason for Consult / Principal Problem: [unfilled]    HPI: Elizabeth Edouard is a 42y o  year old female with history of hypertension, GERD, 34 weeks pregnant, presents for evaluation of focal tenderness in the epigastric region, which is constant, no significant relationship to food intake    She has cut down on green leafy salads which has helped  She has also started taking Colace for constipation which has also helped  This is been ongoing for the past 6 weeks  She reports that she has been taking Pepcid 10 mg b i d  Before breakfast and before lunch which helps control the acid reflux symptoms however reports that her acid reflux symptoms to return by the evening  She does admit that she is eating late at night as she is working during the day and does not eat her dinner until late  She does also report burning sensation in the chest after eating  Recently underwent blood work which showed normal liver enzymes, normal hemoglobin and platelets  She underwent right upper quadrant ultrasound at end of March which was notable only for 6 mm gallbladder polyp, no evidence of gallstones or sludge  There was no evidence of choledocholithiasis  There was a 2 cm hepatic cyst as well  Review of Systems: The remainder of the review of systems was negative except for the pertinent positives noted in HPI  Historical Information   Past Medical History:   Diagnosis Date    Abnormal Pap smear of cervix     in 30's   Anxiety     Environmental allergies     GERD (gastroesophageal reflux disease)     Hypertension     PONV (postoperative nausea and vomiting)     Sinus infection     chronic     Past Surgical History:   Procedure Laterality Date    HAND SURGERY Left     ganglion cyst    KS LARYNGOSCOPY,DIRECT,SCOPE,INJ CORDS N/A 4/10/2017    Procedure: MICROLARYNGOSCOPY BILATERAL VOCAL FOLD INJECTION (DECADRON);   Surgeon: Brock Mcmahan MD;  Location: [facility];  Service: ENT    REFRACTIVE SURGERY  2010    WISDOM TOOTH EXTRACTION       Social History   Social History     Substance and Sexual Activity   Alcohol Use Not Currently    Comment: socially 1-2 drinks/wk     Social History     Substance and Sexual Activity   Drug Use No     Social History     Tobacco Use   Smoking Status Never Smoker   Smokeless Tobacco Never Used     Family History   Problem Relation Age of Onset   Brigid Daniels Breast cancer Mother 48    Hypertension Father     Diabetes type I Brother     Diabetes Maternal Grandmother     Diabetes Paternal Grandfather        Meds/Allergies     (Not in a hospital admission)    No current facility-administered medications for this visit  Allergies   Allergen Reactions    Other      Seasonal         Objective     Blood pressure 138/82, pulse 102, height 5' 4" (1 626 m), weight 93 kg (205 lb), last menstrual period 10/07/2021, SpO2 99 %  [unfilled]    PHYSICAL EXAM     GEN: well nourished, well developed, no acute distress  HEENT: anicteric, MMM  CV: RRR, no m/r/g  CHEST: CTA b/l, no WRR  ABD: +BS, gravid uterus, no appreciable hernias noted in the upper abdominal area  EXT: no c/c/e  SKIN: no rashes,  NEURO: aaox3    Lab Results:   No visits with results within 1 Day(s) from this visit     Latest known visit with results is:   Orders Only on 05/09/2022   Component Date Value    Glucose, Random 05/09/2022 91     BUN 05/09/2022 7     Creatinine 05/09/2022 0 71     eGFR Non  05/09/2022 104     eGFR  05/09/2022 120     SL AMB BUN/CREATININE RA* 81/40/9797 NOT APPLICABLE     Sodium 60/08/9148 139     Potassium 05/09/2022 3 8     Chloride 05/09/2022 107     CO2 05/09/2022 25     Calcium 05/09/2022 8 7     Protein, Total 05/09/2022 6 1     Albumin 05/09/2022 3 5 (A)    Globulin 05/09/2022 2 6     Albumin/Globulin Ratio 05/09/2022 1 3     TOTAL BILIRUBIN 05/09/2022 0 4     Alkaline Phosphatase 05/09/2022 116     AST 05/09/2022 18     ALT 05/09/2022 18     White Blood Cell Count 05/09/2022 7 8     Red Blood Cell Count 05/09/2022 4 49     Hemoglobin 05/09/2022 13 1     HCT 05/09/2022 38 2     MCV 05/09/2022 85 1     MCH 05/09/2022 29 2     MCHC 05/09/2022 34 3     RDW 05/09/2022 13 0     Platelet Count 42/77/2137 187     SL AMB MPV 05/09/2022 10 2     Creatinine, Urine 05/09/2022 149  Protein/Creat Ratio 05/09/2022 107     Protein/Creat Ratio 05/09/2022 0 107     Total Protein, Urine 05/09/2022 16      Imaging Studies: I have personally reviewed pertinent films in PACS

## 2022-06-02 ENCOUNTER — ULTRASOUND (OUTPATIENT)
Dept: PERINATAL CARE | Facility: OTHER | Age: 47
End: 2022-06-02
Payer: COMMERCIAL

## 2022-06-02 ENCOUNTER — ROUTINE PRENATAL (OUTPATIENT)
Dept: PERINATAL CARE | Facility: OTHER | Age: 47
End: 2022-06-02
Payer: COMMERCIAL

## 2022-06-02 VITALS
BODY MASS INDEX: 34.56 KG/M2 | DIASTOLIC BLOOD PRESSURE: 83 MMHG | SYSTOLIC BLOOD PRESSURE: 123 MMHG | HEIGHT: 64 IN | WEIGHT: 202.4 LBS | HEART RATE: 102 BPM

## 2022-06-02 DIAGNOSIS — Z3A.34 34 WEEKS GESTATION OF PREGNANCY: Primary | ICD-10-CM

## 2022-06-02 DIAGNOSIS — O10.919 CHRONIC HYPERTENSION AFFECTING PREGNANCY: ICD-10-CM

## 2022-06-02 DIAGNOSIS — Z36.89 ENCOUNTER FOR ULTRASOUND TO ASSESS FETAL GROWTH: ICD-10-CM

## 2022-06-02 DIAGNOSIS — Z36.2 ENCOUNTER FOR FOLLOW-UP ULTRASOUND OF FETAL ANATOMY: ICD-10-CM

## 2022-06-02 PROBLEM — R10.11 ABDOMINAL PAIN OF RIGHT UPPER QUADRANT DURING PREGNANCY, ANTEPARTUM: Status: RESOLVED | Noted: 2022-03-31 | Resolved: 2022-06-02

## 2022-06-02 PROBLEM — O26.899 ABDOMINAL PAIN OF RIGHT UPPER QUADRANT DURING PREGNANCY, ANTEPARTUM: Status: RESOLVED | Noted: 2022-03-31 | Resolved: 2022-06-02

## 2022-06-02 PROCEDURE — 59025 FETAL NON-STRESS TEST: CPT | Performed by: OBSTETRICS & GYNECOLOGY

## 2022-06-02 PROCEDURE — 76816 OB US FOLLOW-UP PER FETUS: CPT | Performed by: OBSTETRICS & GYNECOLOGY

## 2022-06-02 PROCEDURE — NC001 PR NO CHARGE

## 2022-06-02 PROCEDURE — 99214 OFFICE O/P EST MOD 30 MIN: CPT | Performed by: OBSTETRICS & GYNECOLOGY

## 2022-06-02 RX ORDER — VALACYCLOVIR HYDROCHLORIDE 500 MG/1
500 TABLET, FILM COATED ORAL DAILY
COMMUNITY
Start: 2022-05-25 | End: 2022-07-04

## 2022-06-02 NOTE — PATIENT INSTRUCTIONS
Kick Counts in Pregnancy   AMBULATORY CARE:   Kick counts  measure how much your baby is moving in your womb  A kick from your baby can be felt as a twist, turn, swish, roll, or jab  It is common to feel your baby kicking at 26 to 28 weeks of pregnancy  You may feel your baby kick as early as 20 weeks of pregnancy  You may want to start counting at 28 weeks  Contact your doctor immediately if:   You feel a change in the number of kicks or movements of your baby  You feel fewer than 10 kicks within 2 hours  You have questions or concerns about your baby's movements  Why measure kick counts:  Your baby's movement may provide information about your baby's health  He or she may move less, or not at all, if there are problems  Your baby may move less if he or she is not getting enough oxygen or nutrition from the placenta  Do not smoke while you are pregnant  Smoking decreases the amount of oxygen that gets to your baby  Talk to your healthcare provider if you need help to quit smoking  Tell your healthcare provider as soon as you feel a change in your baby's movements  When to measure kick counts:   Measure kick counts at the same time every day  Measure kick counts when your baby is awake and most active  Your baby may be most active in the evening  How to measure kick counts:  Check that your baby is awake before you measure kick counts  You can wake up your baby by lightly pushing on your belly, walking, or drinking something cold  Your healthcare provider may tell you different ways to measure kick counts  You may be told to do the following:  Use a chart or clock to keep track of the time you start and finish counting  Sit in a chair or lie on your left side  Place your hands on the largest part of your belly  Count until you reach 10 kicks  Write down how much time it takes to count 10 kicks  It may take 30 minutes to 2 hours to count 10 kicks   It should not take more than 2 hours to count 10 kicks  Follow up with your doctor as directed:  Write down your questions so you remember to ask them during your visits  © Copyright [employer] 2022 Information is for End User's use only and may not be sold, redistributed or otherwise used for commercial purposes  All illustrations and images included in CareNotes® are the copyrighted property of A D A M , Inc  or Markus Morley   The above information is an  only  It is not intended as medical advice for individual conditions or treatments  Talk to your doctor, nurse or pharmacist before following any medical regimen to see if it is safe and effective for you  Nonstress Test for Pregnancy   WHAT YOU NEED TO KNOW:   What do I need to know about a nonstress test?  A nonstress test measures your baby's heart rate and movements  Nonstress means that no stress will be placed on your baby during the test   How do I prepare for a nonstress test?  Your healthcare provider will talk to you about how to prepare for this test  He or she may tell you to eat and drink plenty of fluids before your test  If you smoke, you may be asked not to smoke within 2 hours before the test  He or she will also tell you which medicines to take or not take on the day of your test   What will happen during a nonstress test?  You may be asked to lie down or recline back for the test on a bed  One or 2 belts with sensors will be placed around your abdomen  Your baby's heart rate will be recorded with a machine  If your baby does not move, your baby may be asleep  Your healthcare provider may make a noise near your abdomen to try to wake your baby  The test usually takes about 20 minutes, but can take longer if your baby needs to be awakened  What do I need to know about the test results? Your baby will be expected to move at least 2 times for a certain amount of time   Your baby's heart rate will be expected to go up by a certain number of beats per minute during movement  If your baby does not move as expected, the test may need to be repeated or you may need other tests  CARE AGREEMENT:   You have the right to help plan your care  Learn about your health condition and how it may be treated  Discuss treatment options with your healthcare providers to decide what care you want to receive  You always have the right to refuse treatment  The above information is an  only  It is not intended as medical advice for individual conditions or treatments  Talk to your doctor, nurse or pharmacist before following any medical regimen to see if it is safe and effective for you  © Copyright [employer] 2022 Information is for End User's use only and may not be sold, redistributed or otherwise used for commercial purposes   All illustrations and images included in CareNotes® are the copyrighted property of A D A M , Inc  or [address] [place] [address] [employer]

## 2022-06-02 NOTE — LETTER
NST sleeve cover sheet    Patient name: Joshua Suggs          : 1975  MRN: [id number]    HELGA: Estimated Date of Delivery: 22          Obstetrician: ___________Caring for women____________________    Reason(s) for testing:  _________________________AMA, chtn_________________      Testing frequency:    ___ 2x/wk  __x_ 1x/wk  Per Dr Celia Kaufman  ___ Dopplers  ___ BPP?       Last growth scan: __________________________________________

## 2022-06-02 NOTE — PROGRESS NOTES
[facility]: Ms Jay Goodwin was seen today at 34w3d for Nonstress test, and  fetal growth and followup missed anatomy ultrasound  See ultrasound report under "OB Procedures" tab    Please don't hesitate to contact our office with any concerns or questions   -Yahir Patel MD

## 2022-06-02 NOTE — PROGRESS NOTES
NST was done today  Pt  Reported active fetal movement at home between visit  Daily fetal kick count reviewed and emphasized  Patient verbalized understanding of all and was receptive      Doreen Leyden, RN

## 2022-06-02 NOTE — LETTER
June 2, 2022     Rei Forbes    Patient: Danielle Stephenson   YOB: 1975   Date of Visit: 6/2/2022       Dear Dr Amanda Santo: Thank you for referring Jennifer Green to me for evaluation  Below are my notes for this consultation  If you have questions, please do not hesitate to call me  I look forward to following your patient along with you  Sincerely,        Jenny Brooke MD        CC: No Recipients  Jenny Brooke MD  6/2/2022  4:36 PM  Sign when Signing Visit  [address]: Ms Bk Alvarez was seen today at 34w3d for fetal growth and followup missed anatomy ultrasound  See ultrasound report under "OB Procedures" tab    Please don't hesitate to contact our office with any concerns or questions   -Jenny Brooke MD

## 2022-06-03 ENCOUNTER — ROUTINE PRENATAL (OUTPATIENT)
Dept: OBGYN CLINIC | Facility: CLINIC | Age: 47
End: 2022-06-03
Payer: COMMERCIAL

## 2022-06-03 VITALS — WEIGHT: 203 LBS | SYSTOLIC BLOOD PRESSURE: 120 MMHG | BODY MASS INDEX: 34.84 KG/M2 | DIASTOLIC BLOOD PRESSURE: 80 MMHG

## 2022-06-03 DIAGNOSIS — Z34.03 ENCOUNTER FOR SUPERVISION OF NORMAL FIRST PREGNANCY IN THIRD TRIMESTER: ICD-10-CM

## 2022-06-03 DIAGNOSIS — N97.9 INFERTILITY, FEMALE: ICD-10-CM

## 2022-06-03 DIAGNOSIS — E03.8 SUBCLINICAL HYPOTHYROIDISM: ICD-10-CM

## 2022-06-03 DIAGNOSIS — Z3A.34 34 WEEKS GESTATION OF PREGNANCY: Primary | ICD-10-CM

## 2022-06-03 PROCEDURE — 90715 TDAP VACCINE 7 YRS/> IM: CPT | Performed by: OBSTETRICS & GYNECOLOGY

## 2022-06-03 PROCEDURE — PNV: Performed by: OBSTETRICS & GYNECOLOGY

## 2022-06-03 PROCEDURE — 90471 IMMUNIZATION ADMIN: CPT | Performed by: OBSTETRICS & GYNECOLOGY

## 2022-06-03 NOTE — PROGRESS NOTES
Visit:  Good FM - desires TDap today - urine neg / neg - following with NST weekly - very concerned about iol and need for pitocin - tried to review that induction can still be active - was given choice to go between 37 to 39 weeks

## 2022-06-09 ENCOUNTER — ULTRASOUND (OUTPATIENT)
Dept: PERINATAL CARE | Facility: OTHER | Age: 47
End: 2022-06-09
Payer: COMMERCIAL

## 2022-06-09 VITALS
DIASTOLIC BLOOD PRESSURE: 80 MMHG | HEART RATE: 85 BPM | SYSTOLIC BLOOD PRESSURE: 117 MMHG | WEIGHT: 202.6 LBS | BODY MASS INDEX: 34.59 KG/M2 | HEIGHT: 64 IN

## 2022-06-09 DIAGNOSIS — O10.919 CHRONIC HYPERTENSION AFFECTING PREGNANCY: Primary | ICD-10-CM

## 2022-06-09 DIAGNOSIS — Z3A.35 35 WEEKS GESTATION OF PREGNANCY: ICD-10-CM

## 2022-06-09 DIAGNOSIS — O09.513 AMA (ADVANCED MATERNAL AGE) PRIMIGRAVIDA 35+, THIRD TRIMESTER: ICD-10-CM

## 2022-06-09 LAB
DME PARACHUTE DELIVERY DATE ACTUAL: NORMAL
DME PARACHUTE DELIVERY DATE EXPECTED: NORMAL
DME PARACHUTE DELIVERY DATE REQUESTED: NORMAL
DME PARACHUTE ITEM DESCRIPTION: NORMAL
DME PARACHUTE ORDER STATUS: NORMAL
DME PARACHUTE SUPPLIER NAME: NORMAL
DME PARACHUTE SUPPLIER PHONE: NORMAL

## 2022-06-09 PROCEDURE — 59025 FETAL NON-STRESS TEST: CPT | Performed by: OBSTETRICS & GYNECOLOGY

## 2022-06-09 PROCEDURE — 76815 OB US LIMITED FETUS(S): CPT | Performed by: OBSTETRICS & GYNECOLOGY

## 2022-06-09 NOTE — LETTER
June 9, 2022     Rei Coyne    Patient: José Ovalle   YOB: 1975   Date of Visit: 6/9/2022       Dear Dr Urszula Rivas: Thank you for referring Sheri Oliver to me for evaluation  Below are my notes for this consultation  If you have questions, please do not hesitate to call me  I look forward to following your patient along with you  Sincerely,        Farheen Chairez MD        CC: No Recipients  Farheen Chairez MD  6/9/2022  8:42 PM  Sign when Signing Visit  NST is reactive   Farheen Chairez MD

## 2022-06-09 NOTE — LETTER
NST sleeve cover sheet    Patient name: Joshua Suggs  : 1975  MRN: [id number]    HELGA: Estimated Date of Delivery: 22    Obstetrician: _______CFW_________________    Reason(s) for testing:  _______________CHTN, AMA______________________      Testing frequency:    ___ 2x/wk  _X_ 1x/wk  ___ Dopplers  ___ BPP?       Last growth scan: __________________________________________

## 2022-06-09 NOTE — PROGRESS NOTES
Repeat Non-Stress Testing:    Pt verbalizes +FM  Pt denies ALL:               Leaking of fluid   Contractions   Vaginal bleeding   Decreased fetal movement    Patient has no questions or concerns  After starting NST patient suddenly felt light headed and clammy  Patient moved to left side, lower head of bed and given water  BP taken (see VS)  Patient encouraged to drink water slow breathing and drink breakfast shake, ate a piece of candy  Patient after some time turned to sitting position elevated HOB and repeat BP after 5 minutes of sitting upright was WNL  Patient stated she felt fine/normal  Patient stood and ambulated to check out desk without problem

## 2022-06-13 ENCOUNTER — HOSPITAL ENCOUNTER (OUTPATIENT)
Dept: ULTRASOUND IMAGING | Facility: HOSPITAL | Age: 47
Discharge: HOME | End: 2022-06-13
Attending: INTERNAL MEDICINE
Payer: COMMERCIAL

## 2022-06-13 ENCOUNTER — ROUTINE PRENATAL (OUTPATIENT)
Dept: OBGYN CLINIC | Facility: CLINIC | Age: 47
End: 2022-06-13

## 2022-06-13 VITALS — WEIGHT: 207 LBS | DIASTOLIC BLOOD PRESSURE: 82 MMHG | BODY MASS INDEX: 35.53 KG/M2 | SYSTOLIC BLOOD PRESSURE: 116 MMHG

## 2022-06-13 DIAGNOSIS — R10.10 UPPER ABDOMINAL PAIN: ICD-10-CM

## 2022-06-13 DIAGNOSIS — N97.9 INFERTILITY, FEMALE: ICD-10-CM

## 2022-06-13 DIAGNOSIS — Z3A.36 36 WEEKS GESTATION OF PREGNANCY: Primary | ICD-10-CM

## 2022-06-13 DIAGNOSIS — E03.8 SUBCLINICAL HYPOTHYROIDISM: ICD-10-CM

## 2022-06-13 DIAGNOSIS — Z34.83 PRENATAL CARE, SUBSEQUENT PREGNANCY, THIRD TRIMESTER: ICD-10-CM

## 2022-06-13 PROCEDURE — PNV: Performed by: OBSTETRICS & GYNECOLOGY

## 2022-06-13 PROCEDURE — 87150 DNA/RNA AMPLIFIED PROBE: CPT | Performed by: OBSTETRICS & GYNECOLOGY

## 2022-06-13 PROCEDURE — 76700 US EXAM ABDOM COMPLETE: CPT

## 2022-06-13 NOTE — PROGRESS NOTES
Labor Talk done considering epidural, reviewed signs of labor hesitant about epidural and about pitocin, , how to call, has car seat  Reviewed induction and recommendations will schedule gbs done today, Patient reports good fm, no n/v, headache, cramping, bleeding, loss of fluid, edema, dom violence, or smoking  jennifer pnv calf cramps increase mag and calcium, urine neg/neg gbs and labor talk done today has breast pump   Ft/60/-2 soft post message sent to staff

## 2022-06-14 ENCOUNTER — TELEPHONE (OUTPATIENT)
Dept: OBGYN CLINIC | Facility: CLINIC | Age: 47
End: 2022-06-14

## 2022-06-14 NOTE — TELEPHONE ENCOUNTER
----- Message from Jarocho Davison MD sent at 6/13/2022 11:07 AM EDT -----  Regarding: iol  Please evaluate for iol 6/30-7/1 for chtn g1 ft/60/-2

## 2022-06-15 LAB — GP B STREP DNA SPEC QL NAA+PROBE: NEGATIVE

## 2022-06-16 ENCOUNTER — ULTRASOUND (OUTPATIENT)
Dept: PERINATAL CARE | Facility: OTHER | Age: 47
End: 2022-06-16
Payer: COMMERCIAL

## 2022-06-16 VITALS
SYSTOLIC BLOOD PRESSURE: 116 MMHG | HEIGHT: 64 IN | DIASTOLIC BLOOD PRESSURE: 77 MMHG | WEIGHT: 205.2 LBS | BODY MASS INDEX: 35.03 KG/M2 | HEART RATE: 105 BPM

## 2022-06-16 DIAGNOSIS — O10.919 CHRONIC HYPERTENSION AFFECTING PREGNANCY: ICD-10-CM

## 2022-06-16 DIAGNOSIS — O09.513 AMA (ADVANCED MATERNAL AGE) PRIMIGRAVIDA 35+, THIRD TRIMESTER: ICD-10-CM

## 2022-06-16 DIAGNOSIS — Z3A.36 36 WEEKS GESTATION OF PREGNANCY: Primary | ICD-10-CM

## 2022-06-16 PROCEDURE — 59025 FETAL NON-STRESS TEST: CPT | Performed by: OBSTETRICS & GYNECOLOGY

## 2022-06-16 PROCEDURE — 76815 OB US LIMITED FETUS(S): CPT | Performed by: OBSTETRICS & GYNECOLOGY

## 2022-06-16 RX ORDER — PHENOL 1.4 %
600 AEROSOL, SPRAY (ML) MUCOUS MEMBRANE DAILY
COMMUNITY

## 2022-06-20 ENCOUNTER — ROUTINE PRENATAL (OUTPATIENT)
Dept: OBGYN CLINIC | Facility: CLINIC | Age: 47
End: 2022-06-20

## 2022-06-20 VITALS
SYSTOLIC BLOOD PRESSURE: 118 MMHG | HEIGHT: 64 IN | BODY MASS INDEX: 35.1 KG/M2 | WEIGHT: 205.6 LBS | DIASTOLIC BLOOD PRESSURE: 76 MMHG

## 2022-06-20 DIAGNOSIS — E03.8 SUBCLINICAL HYPOTHYROIDISM: ICD-10-CM

## 2022-06-20 DIAGNOSIS — Z3A.37 37 WEEKS GESTATION OF PREGNANCY: ICD-10-CM

## 2022-06-20 DIAGNOSIS — N97.9 INFERTILITY, FEMALE: ICD-10-CM

## 2022-06-20 DIAGNOSIS — Z34.93 PRENATAL CARE IN THIRD TRIMESTER: Primary | ICD-10-CM

## 2022-06-20 PROCEDURE — PNV: Performed by: STUDENT IN AN ORGANIZED HEALTH CARE EDUCATION/TRAINING PROGRAM

## 2022-06-20 NOTE — PROGRESS NOTES
Daija Mckeon is a 41 yo  at 40 weeks gestation presenting for routine PNC- reports Calvin Palomo contractions- denies any LOF or VB  Endorses good FM  Urine neg/neg  Denies any preeclamptic sx- continues on labetalol 200mg PO BID  SVE FT/60/-2, soft, mid  Labor talk and review of induction process completed  IOL scheduled for   Will return in 1 week for [facility]

## 2022-06-20 NOTE — PATIENT INSTRUCTIONS
Pregnancy at 28 to 38 Weeks   AMBULATORY CARE:   Changes happening with your body: You are considered full term at the beginning of 37 weeks  Your breathing may be easier if your baby has moved down into a head-down position  You may need to urinate more often because the baby may be pressing on your bladder  You may also feel more discomfort and get tired easily  Seek care immediately if:   You develop a severe headache that does not go away  You have new or increased vision changes, such as blurred or spotted vision  You have new or increased swelling in your face or hands  You have vaginal spotting or bleeding  Your water broke or you feel warm water gushing or trickling from your vagina  Call your obstetrician if:   You have more than 5 contractions in 1 hour  You notice any changes in your baby's movements  You have abdominal cramps, pressure, or tightening  You have a change in vaginal discharge  You have chills or a fever  You have vaginal itching, burning, or pain  You have yellow, green, white, or foul-smelling vaginal discharge  You have pain or burning when you urinate, less urine than usual, or pink or bloody urine  You have questions or concerns about your condition or care  How to care for yourself at this stage of your pregnancy:       Eat a variety of healthy foods  Healthy foods include fruits, vegetables, whole-grain breads, low-fat dairy foods, beans, lean meats, and fish  Drink liquids as directed  Ask how much liquid to drink each day and which liquids are best for you  Limit caffeine to less than 200 milligrams each day  Limit your intake of fish to 2 servings each week  Choose fish low in mercury such as canned light tuna, shrimp, salmon, cod, or tilapia  Do not  eat fish high in mercury such as swordfish, tilefish, ivet mackerel, and shark  Take prenatal vitamins as directed    Your need for certain vitamins and minerals, such as folic acid, increases during pregnancy  Prenatal vitamins provide some of the extra vitamins and minerals you need  Prenatal vitamins may also help to decrease the risk of certain birth defects  Rest as needed  Put your feet up if you have swelling in your ankles and feet  Talk to your healthcare provider about exercise  Moderate exercise can help you stay fit  Your healthcare provider will help you plan an exercise program that is safe for you during pregnancy  Do not smoke  Smoking increases your risk of a miscarriage and other health problems during your pregnancy  Smoking can cause your baby to be born early or weigh less at birth  Ask your healthcare provider for information if you need help quitting  Do not drink alcohol  Alcohol passes from your body to your baby through the placenta  It can affect your baby's brain development and cause fetal alcohol syndrome (FAS)  FAS is a group of conditions that causes mental, behavior, and growth problems  Talk to your healthcare provider before you take any medicines  Many medicines may harm your baby if you take them when you are pregnant  Do not take any medicines, vitamins, herbs, or supplements without first talking to your healthcare provider  Never use illegal or street drugs (such as marijuana or cocaine) while you are pregnant  Safety tips during pregnancy:   Avoid hot tubs and saunas  Do not use a hot tub or sauna while you are pregnant, especially during your first trimester  Hot tubs and saunas may raise your baby's temperature and increase the risk of birth defects  Avoid toxoplasmosis  This is an infection caused by eating raw meat or being around infected cat feces  It can cause birth defects, miscarriages, and other problems  Wash your hands after you touch raw meat  Make sure any meat is well-cooked before you eat it  Avoid raw eggs and unpasteurized milk   Use gloves or ask someone else to clean your cat's litter box while you are pregnant  Ask your healthcare provider about travel  The most comfortable time to travel is during the second trimester  Ask your provider if you can travel after 36 weeks  You may not be able to travel in an airplane after 36 weeks  He or she may also recommend you avoid long road trips  Changes happening with your baby:  By 38 weeks, your baby may weigh between 6 and 9 pounds  Your baby may be about 14 inches long from the top of the head to the rump (baby's bottom)  Your baby hears well enough to know your voice  As your baby gets larger, you may feel fewer kicks and more stretching and rolling  Your baby may move into a head-down position  Your baby will also rest lower in your abdomen  What you need to know about prenatal care: Your healthcare provider will check your blood pressure and weight  You may also need the following:  A urine test  may also be done to check for sugar and protein  These can be signs of gestational diabetes or infection  Protein in your urine may also be a sign of preeclampsia  Preeclampsia is a condition that can develop during week 20 or later of your pregnancy  It causes high blood pressure, and it can cause problems with your kidneys and other organs  A gestational diabetes screen  may be done  Your healthcare provider may order either a 1-step or 2-step oral glucose tolerance test (OGTT)  1-step OGTT:  Your blood sugar level will be tested after you have not eaten for 8 hours (fasting)  You will then be given a glucose drink  Your level will be tested again 1 hour and 2 hours after you finish the drink  2-step OGTT:  You do not have to fast for the first part of the test  You will have the glucose drink at any time of day  Your blood sugar level will be checked 1 hour later  If your blood sugar is higher than a certain level, another test will be ordered  You will fast and your blood sugar level will be tested  You will have the glucose drink  Your blood will be tested again 1 hour, 2 hours, and 3 hours after you finish the glucose drink  A blood test  may be done to check for anemia (low iron level)  A Tdap vaccine  may be recommended by your healthcare provider  A group B strep test  is a test that is done to check for group B strep infection  Group B strep is a type of bacteria that may be found in the vagina or rectum  It can be passed to your baby during delivery if you have it  Your healthcare provider will take swab your vagina or rectum and send the sample to the lab for tests  Fundal height  is a measurement of your uterus to check your baby's growth  This number is usually the same as the number of weeks that you have been pregnant  Your healthcare provider may also check your baby's position  Your baby's heart rate  will be checked  Follow up with your obstetrician as directed:  Write down your questions so you remember to ask them during your visits  © Copyright [address] 2022 Information is for End User's use only and may not be sold, redistributed or otherwise used for commercial purposes  All illustrations and images included in CareNotes® are the copyrighted property of [employer]  or [address] [place], [place] [address]sue solitario   The above information is an  only  It is not intended as medical advice for individual conditions or treatments  Talk to your doctor, nurse or pharmacist before following any medical regimen to see if it is safe and effective for you

## 2022-06-22 NOTE — PATIENT INSTRUCTIONS
Thank you for choosing us for your  care today  If you have any questions about your ultrasound or care, please do not hesitate to contact us or your primary obstetrician  Some general instructions for your pregnancy are:    Protect against coronavirus: get vaccinated - pregnant women are increased risk of severe COVID  Notify your primary care doctor if you have any symptoms  Exercise: Aim for 22 minutes per day (150 minutes per week) of regular exercise  Walking is great! Nutrition: aim for calcium-rich and iron-rich foods as well as healthy sources of protein  Learn about Preeclampsia: preeclampsia is a common, serious high blood pressure complication in pregnancy  A blood pressure of 083VMSF (systolic or top number) or 41BKLS (diastolic or bottom number) is not normal and needs evaluation by your doctor  Aspirin is sometimes prescribed in early pregnancy to prevent preeclampsia in women with risk factors - ask your obstetrician if you should be on this medication  If you smoke, try to reduce how many cigarettes you smoke or try to quit completely  Do not vape  Other warning signs to watch out for in pregnancy or postpartum: chest pain, obstructed breathing or shortness of breath, seizures, thoughts of hurting yourself or your baby, bleeding, a painful or swollen leg, fever, or headache (see AWHONN POST-BIRTH Warning Signs campaign)  If these happen call 911  Itching is also not normal in pregnancy and if you experience this, especially over your hands and feet, potentially worse at night, notify your doctors

## 2022-06-23 ENCOUNTER — ULTRASOUND (OUTPATIENT)
Dept: PERINATAL CARE | Facility: OTHER | Age: 47
End: 2022-06-23
Payer: COMMERCIAL

## 2022-06-23 VITALS
HEIGHT: 64 IN | WEIGHT: 205.4 LBS | DIASTOLIC BLOOD PRESSURE: 72 MMHG | BODY MASS INDEX: 35.07 KG/M2 | SYSTOLIC BLOOD PRESSURE: 102 MMHG | HEART RATE: 101 BPM

## 2022-06-23 DIAGNOSIS — O10.919 CHRONIC HYPERTENSION AFFECTING PREGNANCY: Primary | ICD-10-CM

## 2022-06-23 DIAGNOSIS — Z3A.37 37 WEEKS GESTATION OF PREGNANCY: ICD-10-CM

## 2022-06-23 PROCEDURE — 76815 OB US LIMITED FETUS(S): CPT | Performed by: OBSTETRICS & GYNECOLOGY

## 2022-06-23 PROCEDURE — 59025 FETAL NON-STRESS TEST: CPT | Performed by: OBSTETRICS & GYNECOLOGY

## 2022-06-24 NOTE — PROGRESS NOTES
[id number] [facility]: Ms Cody Severin was seen today for NST (found under the pregnancy episode) which I reviewed the RN assessment and agree, and CUBA (see ultrasound report under OB procedures tab)  Please don't hesitate to contact our office with any concerns or questions    Marika Lombardi MD

## 2022-06-28 ENCOUNTER — ULTRASOUND (OUTPATIENT)
Dept: PERINATAL CARE | Facility: OTHER | Age: 47
End: 2022-06-28
Payer: COMMERCIAL

## 2022-06-28 VITALS
WEIGHT: 205.6 LBS | DIASTOLIC BLOOD PRESSURE: 86 MMHG | SYSTOLIC BLOOD PRESSURE: 128 MMHG | HEART RATE: 102 BPM | HEIGHT: 64 IN | BODY MASS INDEX: 35.1 KG/M2

## 2022-06-28 DIAGNOSIS — O10.919 CHRONIC HYPERTENSION AFFECTING PREGNANCY: Primary | ICD-10-CM

## 2022-06-28 DIAGNOSIS — Z3A.38 38 WEEKS GESTATION OF PREGNANCY: ICD-10-CM

## 2022-06-28 DIAGNOSIS — O09.513 AMA (ADVANCED MATERNAL AGE) PRIMIGRAVIDA 35+, THIRD TRIMESTER: ICD-10-CM

## 2022-06-28 PROCEDURE — 59025 FETAL NON-STRESS TEST: CPT | Performed by: NURSE PRACTITIONER

## 2022-06-28 PROCEDURE — 76815 OB US LIMITED FETUS(S): CPT | Performed by: OBSTETRICS & GYNECOLOGY

## 2022-06-28 PROCEDURE — 59025 FETAL NON-STRESS TEST: CPT | Performed by: OBSTETRICS & GYNECOLOGY

## 2022-06-28 PROCEDURE — NC001 PR NO CHARGE: Performed by: OBSTETRICS & GYNECOLOGY

## 2022-06-28 NOTE — PROGRESS NOTES
Repeat Non-Stress Testing:    Pt verbalizes +FM  Pt denies ALL:               Leaking of fluid   Contractions   Vaginal bleeding   Decreased fetal movement    Patient has no questions or concerns Pt states she is  having some lower extremity swelling  For IOL on 6/3022

## 2022-06-28 NOTE — PATIENT INSTRUCTIONS
Thank you for choosing us for your  care today  If you have any questions about your ultrasound or care, please do not hesitate to contact us or your primary obstetrician  Some general instructions for your pregnancy are:    Protect against coronavirus: get vaccinated - pregnant women are increased risk of severe COVID  Notify your primary care doctor if you have any symptoms  Exercise: Aim for 22 minutes per day (150 minutes per week) of regular exercise  Walking is great! Nutrition: aim for calcium-rich and iron-rich foods as well as healthy sources of protein  Learn about Preeclampsia: preeclampsia is a common, serious high blood pressure complication in pregnancy  A blood pressure of 422GMQO (systolic or top number) or 13HVOT (diastolic or bottom number) is not normal and needs evaluation by your doctor  Aspirin is sometimes prescribed in early pregnancy to prevent preeclampsia in women with risk factors - ask your obstetrician if you should be on this medication  If you smoke, try to reduce how many cigarettes you smoke or try to quit completely  Do not vape  Other warning signs to watch out for in pregnancy or postpartum: chest pain, obstructed breathing or shortness of breath, seizures, thoughts of hurting yourself or your baby, bleeding, a painful or swollen leg, fever, or headache (see AWHONN POST-BIRTH Warning Signs campaign)  If these happen call 911  Itching is also not normal in pregnancy and if you experience this, especially over your hands and feet, potentially worse at night, notify your doctors

## 2022-06-30 ENCOUNTER — ROUTINE PRENATAL (OUTPATIENT)
Dept: OBGYN CLINIC | Facility: CLINIC | Age: 47
End: 2022-06-30

## 2022-06-30 VITALS
DIASTOLIC BLOOD PRESSURE: 96 MMHG | SYSTOLIC BLOOD PRESSURE: 138 MMHG | WEIGHT: 204 LBS | HEIGHT: 64 IN | BODY MASS INDEX: 34.83 KG/M2

## 2022-06-30 DIAGNOSIS — Z3A.38 38 WEEKS GESTATION OF PREGNANCY: ICD-10-CM

## 2022-06-30 DIAGNOSIS — Z34.93 PRENATAL CARE IN THIRD TRIMESTER: Primary | ICD-10-CM

## 2022-06-30 PROCEDURE — PNV: Performed by: STUDENT IN AN ORGANIZED HEALTH CARE EDUCATION/TRAINING PROGRAM

## 2022-06-30 NOTE — PROGRESS NOTES
Rajani Guzman is a 41 yo  at 38 weeks and 3 days presenting for routine PNC- denies CTX but does have cramping, LOF or VB  Endorses good FM  Urine neg/neg  Has IOL scheduled this evening  She is FT/60/-2, anterior  Reviewed induction process at length and all questions and concerns addressed

## 2022-07-01 ENCOUNTER — HOSPITAL ENCOUNTER (OUTPATIENT)
Dept: LABOR AND DELIVERY | Facility: HOSPITAL | Age: 47
Discharge: HOME | End: 2022-07-01
Payer: COMMERCIAL

## 2022-07-01 ENCOUNTER — HOSPITAL ENCOUNTER (INPATIENT)
Facility: HOSPITAL | Age: 47
LOS: 3 days | Discharge: HOME | End: 2022-07-04
Attending: STUDENT IN AN ORGANIZED HEALTH CARE EDUCATION/TRAINING PROGRAM | Admitting: STUDENT IN AN ORGANIZED HEALTH CARE EDUCATION/TRAINING PROGRAM
Payer: COMMERCIAL

## 2022-07-01 DIAGNOSIS — Z3A.38 38 WEEKS GESTATION OF PREGNANCY: Primary | ICD-10-CM

## 2022-07-01 PROBLEM — I10 CHRONIC HYPERTENSION: Status: ACTIVE | Noted: 2022-07-01

## 2022-07-01 PROBLEM — B00.9 HSV-1 (HERPES SIMPLEX VIRUS 1) INFECTION: Status: ACTIVE | Noted: 2022-07-01

## 2022-07-01 PROBLEM — Z34.90 ENCOUNTER FOR INDUCTION OF LABOR: Status: ACTIVE | Noted: 2022-07-01

## 2022-07-01 LAB
ABO GROUP BLD: NORMAL
ALBUMIN SERPL BCP-MCNC: 3.6 G/DL (ref 3.5–5)
ALP SERPL-CCNC: 177 U/L (ref 34–104)
ALT SERPL W P-5'-P-CCNC: 14 U/L (ref 7–52)
ANION GAP SERPL CALCULATED.3IONS-SCNC: 10 MMOL/L (ref 4–13)
AST SERPL W P-5'-P-CCNC: 18 U/L (ref 13–39)
BILIRUB SERPL-MCNC: 0.53 MG/DL (ref 0.2–1)
BLD GP AB SCN SERPL QL: NEGATIVE
BUN SERPL-MCNC: 9 MG/DL (ref 5–25)
CALCIUM SERPL-MCNC: 9 MG/DL (ref 8.4–10.2)
CHLORIDE SERPL-SCNC: 105 MMOL/L (ref 96–108)
CO2 SERPL-SCNC: 21 MMOL/L (ref 21–32)
CREAT SERPL-MCNC: 0.67 MG/DL (ref 0.6–1.3)
CREAT UR-MCNC: 28 MG/DL
ERYTHROCYTE [DISTWIDTH] IN BLOOD BY AUTOMATED COUNT: 13.3 % (ref 11.6–15.1)
GFR SERPL CREATININE-BSD FRML MDRD: 107 ML/MIN/1.73SQ M
GLUCOSE SERPL-MCNC: 77 MG/DL (ref 65–140)
HCT VFR BLD AUTO: 42.6 % (ref 34.8–46.1)
HGB BLD-MCNC: 14.4 G/DL (ref 11.5–15.4)
MCH RBC QN AUTO: 29.4 PG (ref 26.8–34.3)
MCHC RBC AUTO-ENTMCNC: 33.8 G/DL (ref 31.4–37.4)
MCV RBC AUTO: 87 FL (ref 82–98)
PLATELET # BLD AUTO: 172 THOUSANDS/UL (ref 149–390)
PMV BLD AUTO: 10.4 FL (ref 8.9–12.7)
POTASSIUM SERPL-SCNC: 3.7 MMOL/L (ref 3.5–5.3)
PROT SERPL-MCNC: 6.6 G/DL (ref 6.4–8.4)
PROT UR-MCNC: <4 MG/DL
PROT/CREAT UR: <0.14 MG/G{CREAT} (ref 0–0.1)
RBC # BLD AUTO: 4.89 MILLION/UL (ref 3.81–5.12)
RH BLD: POSITIVE
SODIUM SERPL-SCNC: 136 MMOL/L (ref 135–147)
SPECIMEN EXPIRATION DATE: NORMAL
WBC # BLD AUTO: 8.49 THOUSAND/UL (ref 4.31–10.16)

## 2022-07-01 PROCEDURE — 86850 RBC ANTIBODY SCREEN: CPT

## 2022-07-01 PROCEDURE — 86592 SYPHILIS TEST NON-TREP QUAL: CPT

## 2022-07-01 PROCEDURE — 86901 BLOOD TYPING SEROLOGIC RH(D): CPT

## 2022-07-01 PROCEDURE — 86900 BLOOD TYPING SEROLOGIC ABO: CPT

## 2022-07-01 PROCEDURE — 84156 ASSAY OF PROTEIN URINE: CPT

## 2022-07-01 PROCEDURE — 82570 ASSAY OF URINE CREATININE: CPT

## 2022-07-01 PROCEDURE — 3E0P7VZ INTRODUCTION OF HORMONE INTO FEMALE REPRODUCTIVE, VIA NATURAL OR ARTIFICIAL OPENING: ICD-10-PCS | Performed by: OBSTETRICS & GYNECOLOGY

## 2022-07-01 PROCEDURE — 10907ZC DRAINAGE OF AMNIOTIC FLUID, THERAPEUTIC FROM PRODUCTS OF CONCEPTION, VIA NATURAL OR ARTIFICIAL OPENING: ICD-10-PCS | Performed by: OBSTETRICS & GYNECOLOGY

## 2022-07-01 PROCEDURE — 85027 COMPLETE CBC AUTOMATED: CPT

## 2022-07-01 PROCEDURE — 4A1HXCZ MONITORING OF PRODUCTS OF CONCEPTION, CARDIAC RATE, EXTERNAL APPROACH: ICD-10-PCS | Performed by: OBSTETRICS & GYNECOLOGY

## 2022-07-01 PROCEDURE — NC001 PR NO CHARGE: Performed by: STUDENT IN AN ORGANIZED HEALTH CARE EDUCATION/TRAINING PROGRAM

## 2022-07-01 PROCEDURE — 80053 COMPREHEN METABOLIC PANEL: CPT

## 2022-07-01 RX ORDER — LABETALOL 200 MG/1
200 TABLET, FILM COATED ORAL EVERY 12 HOURS
Status: DISCONTINUED | OUTPATIENT
Start: 2022-07-01 | End: 2022-07-04 | Stop reason: HOSPADM

## 2022-07-01 RX ORDER — LEVOTHYROXINE SODIUM 0.03 MG/1
25 TABLET ORAL DAILY
Status: DISCONTINUED | OUTPATIENT
Start: 2022-07-01 | End: 2022-07-02

## 2022-07-01 RX ORDER — OXYTOCIN/RINGER'S LACTATE 30/500 ML
1-30 PLASTIC BAG, INJECTION (ML) INTRAVENOUS
Status: DISCONTINUED | OUTPATIENT
Start: 2022-07-01 | End: 2022-07-02

## 2022-07-01 RX ORDER — SODIUM CHLORIDE, SODIUM LACTATE, POTASSIUM CHLORIDE, CALCIUM CHLORIDE 600; 310; 30; 20 MG/100ML; MG/100ML; MG/100ML; MG/100ML
125 INJECTION, SOLUTION INTRAVENOUS CONTINUOUS
Status: DISCONTINUED | OUTPATIENT
Start: 2022-07-01 | End: 2022-07-04 | Stop reason: HOSPADM

## 2022-07-01 RX ORDER — LORATADINE 10 MG/1
10 TABLET ORAL DAILY
Status: DISCONTINUED | OUTPATIENT
Start: 2022-07-02 | End: 2022-07-04 | Stop reason: HOSPADM

## 2022-07-01 RX ADMIN — Medication 2 MILLI-UNITS/MIN: at 17:22

## 2022-07-01 RX ADMIN — SODIUM CHLORIDE, SODIUM LACTATE, POTASSIUM CHLORIDE, AND CALCIUM CHLORIDE 125 ML/HR: .6; .31; .03; .02 INJECTION, SOLUTION INTRAVENOUS at 17:22

## 2022-07-01 RX ADMIN — Medication 50 MCG: at 12:57

## 2022-07-01 RX ADMIN — LABETALOL HYDROCHLORIDE 200 MG: 200 TABLET, FILM COATED ORAL at 21:32

## 2022-07-01 NOTE — OB LABOR/OXYTOCIN SAFETY PROGRESS
Oxytocin Safety Progress Check Note - Say Reddy 42 y o  female MRN: [id number]    Unit/Bed#: -01 Encounter: [id number]    Dose (jackie-units/min) Oxytocin: 4 jackie-units/min  Contraction Frequency (minutes): occasional  Contraction Quality: Mild  Tachysystole: No   Cervical Dilation: 4        Cervical Effacement: 50  Fetal Station: -3  Baseline Rate: 140 bpm  Fetal Heart Rate: 140 BPM  FHR Category: Category I               Vital Signs:   Vitals:    07/01/22 1756   BP: 116/78   Pulse:    Resp:    Temp: 98 5 °F (36 9 °C)       Notes/comments:   SVE deferred  Plan for SVE 2h after pitocin start  Currently at 4 mU/min, toco q2-5m  Patient comfortable  FHT category 1          Yan Leija MD 7/1/2022 6:23 PM

## 2022-07-01 NOTE — ASSESSMENT & PLAN NOTE
Continue home dose labetalol 200 mg BID  CBC, CMP, P/C pending  Continue to monitor blood pressures  Systolic (60NTK), QGY:512 , Min:99 , WRM:429   Diastolic (46QQB), TUT:19, Min:51, Max:94  No acute treatment of blood pressures

## 2022-07-01 NOTE — ASSESSMENT & PLAN NOTE
Plan for jansen balloon and PO cytotec for cervical ripening  Will start Pitocin when able  Epidural at pt request  Prenatal labs reviewed    Admission labs pending    Now s/p

## 2022-07-01 NOTE — H&P
[address] [facility] 42 y o  female MRN: [id number]  Unit/Bed#: LD TRIAGE 4-01 Encounter: [id number]    Assessment: 42 y o  Laura Goddard at 38w4d admitted for chronic HTN  SVE: 1/50/-2  FHT: 145  Clinical EFW: 7 5 lbs,  Last U/S on 6/2 EFW was 5 lbs 9 oz (58%); Vertex confirmed by U/S in triage  GBS status: Negative     Plan:   * 38 weeks gestation of pregnancy  Assessment & Plan  Plan for jansen balloon and PO cytotec for cervical ripening  Will start Pitocin when able  Epidural at pt request  Prenatal labs reviewed  Admission labs pending      Chronic hypertension affecting pregnancy  Assessment & Plan  Continue home dose labetalol 200 mg BID  CBC, CMP, P/C pending  Continue to monitor blood pressures in labor  Subclinical hypothyroidism  Assessment & Plan  Continue Levothyroxine 25 mcg     Dr Ursula Guerin aware  Chief Complaint: "Here for my induction"    HPI: Yg Hunt is a 42 y o  Laura Goddard with an HELGA of 7/11/2022, by Other Basis at 38w4d who is being admitted for IOL for chronic HTN  She is currently managed on Labetalol 200 mg BID  States that she took her morning dose today  She denies having uterine contractions, has no LOF, and reports no VB  She states she has felt good YVONNE Hoffman Patient Active Problem List   Diagnosis    Chronic hypertension affecting pregnancy    Encounter for supervision of normal first pregnancy in third trimester    Subchorionic hematoma in first trimester    38 weeks gestation of pregnancy    Subclinical hypothyroidism    COVID-19 affecting pregnancy in first trimester    AMA (advanced maternal age) primigravida 33+, third trimester    Infertility, female    Gallbladder polyp    Upper abdominal pain    Gastroesophageal reflux disease    Chronic hypertension    HSV-1 (herpes simplex virus 1) infection       Baby complications/comments: none    Review of Systems   Constitutional: Negative for chills and fever     Respiratory: Negative for cough and shortness of breath  Cardiovascular: Negative for chest pain and leg swelling  Gastrointestinal: Negative for abdominal pain, nausea and vomiting  Genitourinary: Negative for pelvic pain, vaginal bleeding, vaginal discharge and vaginal pain  Neurological: Negative for dizziness, weakness and headaches  OB Hx:  OB History    Para Term  AB Living   1 0 0 0 0 0   SAB IAB Ectopic Multiple Live Births   0 0 0 0 0      # Outcome Date GA Lbr Marito/2nd Weight Sex Delivery Anes PTL Lv   1 Current                Past Medical Hx:  Past Medical History:   Diagnosis Date    Abnormal Pap smear of cervix     in 's   Anxiety     Environmental allergies     GERD (gastroesophageal reflux disease)     History of infertility     Hypertension     PONV (postoperative nausea and vomiting)     Sinus infection     chronic    Subclinical hypothyroidism        Past Surgical hx:  Past Surgical History:   Procedure Laterality Date    HAND SURGERY Left     ganglion cyst    OR LARYNGOSCOPY,DIRECT,SCOPE,INJ CORDS N/A 4/10/2017    Procedure: MICROLARYNGOSCOPY BILATERAL VOCAL FOLD INJECTION (DECADRON);   Surgeon: Mary Montoya MD;  Location: [facility];  Service: ENT   [address] [place] [address]      WISDOM TOOTH EXTRACTION         Social Hx:  Alcohol use: denies  Tobacco use: denies  Other substance use: denies      Allergies   Allergen Reactions    Other      Seasonal         Medications Prior to Admission   Medication    Ascorbic Acid (VITAMIN C PO)    calcium carbonate (OS-ROSA) 600 MG tablet    Cetirizine HCl (ZYRTEC ALLERGY PO)    docusate sodium (COLACE) 100 mg capsule    labetalol (NORMODYNE) 200 mg tablet    levothyroxine 25 mcg tablet    Prenatal MV-Min-Fe Fum-FA-DHA (PRENATAL 1 PO)    Pyridoxine HCl (VITAMIN B-6) 500 MG tablet    VITAMIN D PO    melatonin 3 mg    valACYclovir (VALTREX) 500 mg tablet       Objective:  Temp:  [97 6 °F (36 4 °C)] 97 6 °F (36 4 °C)  Resp:  [18] 18  Body mass index is 35 02 kg/m²  Physical Exam:  Physical Exam  Constitutional:       General: She is not in acute distress  Appearance: Normal appearance  She is not ill-appearing  Genitourinary:      Vulva normal       No vaginal discharge  HENT:      Head: Normocephalic and atraumatic  Cardiovascular:      Pulses: Normal pulses  Pulmonary:      Effort: Pulmonary effort is normal  No respiratory distress  Breath sounds: No stridor  Abdominal:      Palpations: Abdomen is soft  Tenderness: There is no abdominal tenderness  Comments: gravid   Musculoskeletal:         General: No swelling or tenderness  Neurological:      General: No focal deficit present  Mental Status: She is alert and oriented to person, place, and time  Mental status is at baseline  Skin:     General: Skin is warm and dry     Psychiatric:         Mood and Affect: Mood normal          Judgment: Judgment normal             FHT: 1055 hrs: Baseline FHR 145bpm with moderate variability, 15x15 accelerations present with no delecerations     TOCO: none      Lab Results   Component Value Date    WBC 7 8 05/09/2022    HGB 13 1 05/09/2022    HCT 38 2 05/09/2022     05/09/2022     Lab Results   Component Value Date    K 3 8 05/09/2022     05/09/2022    CO2 25 05/09/2022    BUN 7 05/09/2022    CREATININE 0 71 05/09/2022    AST 18 05/09/2022    ALT 18 05/09/2022     Prenatal Labs: Reviewed      Blood type: A+  Antibody: Negative  GBS: Negative  HIV: Negative  Rubella: Immune  VDRL/RPR: Non reactive  HBsAg: Negative  Chlamydia: Negative  Gonorrhea: Negative  Diabetes 1 hour screen: 83 mg/dL  3 hour glucose: 99 mg/dL  Platelets: 363,739 (45/27/72)  Hgb: 14 9  (12/29/21)  >2 Midnights  INPATIENT     Signature/Title: Bettina Soler DO  Date: 7/1/2022  Time: 11:11 AM

## 2022-07-01 NOTE — OB LABOR/OXYTOCIN SAFETY PROGRESS
Labor Progress Note - Emigdio Reddy 42 y o  female MRN: [id number]    Unit/Bed#: -01 Encounter: [id number]       Contraction Frequency (minutes): none         Cervical Dilation: 1        Cervical Effacement: 50  Fetal Station: -2  Baseline Rate: 140 bpm  Fetal Heart Rate: 146 BPM  FHR Category: Category I               Vital Signs:   Vitals:    07/01/22 0857   BP: 124/74   Pulse: 97   Resp:    Temp:        Notes/comments:     PROCEDURE:  JANSEN BALLOON PLACEMENT    A 24F jansen with a 30cc balloon was selected, SVE was performed and cervix was located, jansen was introduced over sterile gloved hands  Balloon advanced through cervix beyond the internal cervical os  A small amount amount of sterile saline solution was instilled in the balloon to confirm placement  Placement was confirmed to be beyond the internal cervical os  A total of 60cc of sterile saline solution was placed into the balloon  Pt tolerated well  Instructions left with RN to place jansen to gravity with a 1L bag of IV fluid  Notify MD when jansen dislodged  Will plan to give PO Cytotec in approximately 1 hour  FHT with moderate variability, accelerations, no decelerations      MD Tracey Rivera MD 7/1/2022 11:47 AM

## 2022-07-01 NOTE — OB LABOR/OXYTOCIN SAFETY PROGRESS
Labor Progress Note - Kelle Reddy 42 y o  female MRN: [id number]    Unit/Bed#: -01 Encounter: [id number]       Contraction Frequency (minutes): Irregular  Contraction Quality: Mild  Tachysystole: No   Cervical Dilation: 4        Cervical Effacement: 50  Fetal Station: -3  Baseline Rate: 140 bpm  Fetal Heart Rate: 139 BPM  FHR Category: Category I               Vital Signs:   Vitals:    07/01/22 1320   BP: 137/88   Pulse: 81   Resp:    Temp:        Notes/comments:   [place]yer balloon removed at 1555 hrs, sitting in vagina  Irregular contractions, cat 1 fetal heart tracing, will start pitocin 4 hrs from last cytotec at 1700 hrs  Dr Emilia Gallo DO 7/1/2022 3:58 PM

## 2022-07-02 ENCOUNTER — ANESTHESIA (INPATIENT)
Dept: ANESTHESIOLOGY | Facility: HOSPITAL | Age: 47
End: 2022-07-02
Payer: COMMERCIAL

## 2022-07-02 ENCOUNTER — ANESTHESIA EVENT (INPATIENT)
Dept: ANESTHESIOLOGY | Facility: HOSPITAL | Age: 47
End: 2022-07-02
Payer: COMMERCIAL

## 2022-07-02 LAB
BASE EXCESS BLDCOA CALC-SCNC: -4.3 MMOL/L (ref 3–11)
BASE EXCESS BLDCOV CALC-SCNC: -2.5 MMOL/L (ref 1–9)
HCO3 BLDCOA-SCNC: 22.2 MMOL/L (ref 17.3–27.3)
HCO3 BLDCOV-SCNC: 22.7 MMOL/L (ref 12.2–28.6)
O2 CT VFR BLDCOA CALC: 9.1 ML/DL
OXYHGB MFR BLDCOA: 43.9 %
OXYHGB MFR BLDCOV: 56.1 %
PCO2 BLDCOA: 45.9 MM[HG] (ref 30–60)
PCO2 BLDCOV: 40.7 MM HG (ref 27–43)
PH BLDCOA: 7.3 [PH] (ref 7.23–7.43)
PH BLDCOV: 7.36 [PH] (ref 7.19–7.49)
PO2 BLDCOA: 20 MM HG (ref 5–25)
PO2 BLDCOV: 22.9 MM HG (ref 15–45)
SAO2 % BLDCOV: 12.2 ML/DL

## 2022-07-02 PROCEDURE — 0HQ9XZZ REPAIR PERINEUM SKIN, EXTERNAL APPROACH: ICD-10-PCS | Performed by: OBSTETRICS & GYNECOLOGY

## 2022-07-02 PROCEDURE — 59400 OBSTETRICAL CARE: CPT | Performed by: OBSTETRICS & GYNECOLOGY

## 2022-07-02 PROCEDURE — 82805 BLOOD GASES W/O2 SATURATION: CPT | Performed by: STUDENT IN AN ORGANIZED HEALTH CARE EDUCATION/TRAINING PROGRAM

## 2022-07-02 RX ORDER — LIDOCAINE HYDROCHLORIDE AND EPINEPHRINE 15; 5 MG/ML; UG/ML
INJECTION, SOLUTION EPIDURAL AS NEEDED
Status: DISCONTINUED | OUTPATIENT
Start: 2022-07-02 | End: 2022-07-03 | Stop reason: HOSPADM

## 2022-07-02 RX ORDER — IBUPROFEN 600 MG/1
600 TABLET ORAL EVERY 6 HOURS PRN
Status: DISCONTINUED | OUTPATIENT
Start: 2022-07-02 | End: 2022-07-04 | Stop reason: HOSPADM

## 2022-07-02 RX ORDER — ACETAMINOPHEN 325 MG/1
650 TABLET ORAL EVERY 6 HOURS PRN
Status: DISCONTINUED | OUTPATIENT
Start: 2022-07-02 | End: 2022-07-04 | Stop reason: HOSPADM

## 2022-07-02 RX ORDER — ONDANSETRON 2 MG/ML
4 INJECTION INTRAMUSCULAR; INTRAVENOUS EVERY 6 HOURS PRN
Status: DISCONTINUED | OUTPATIENT
Start: 2022-07-02 | End: 2022-07-02

## 2022-07-02 RX ORDER — OXYTOCIN/RINGER'S LACTATE 30/500 ML
250 PLASTIC BAG, INJECTION (ML) INTRAVENOUS ONCE
Status: DISCONTINUED | OUTPATIENT
Start: 2022-07-02 | End: 2022-07-04 | Stop reason: HOSPADM

## 2022-07-02 RX ORDER — LEVOTHYROXINE SODIUM 0.03 MG/1
25 TABLET ORAL
Status: DISCONTINUED | OUTPATIENT
Start: 2022-07-02 | End: 2022-07-04 | Stop reason: HOSPADM

## 2022-07-02 RX ORDER — DIAPER,BRIEF,INFANT-TODD,DISP
1 EACH MISCELLANEOUS DAILY PRN
Status: DISCONTINUED | OUTPATIENT
Start: 2022-07-02 | End: 2022-07-04 | Stop reason: HOSPADM

## 2022-07-02 RX ORDER — DIPHENHYDRAMINE HCL 25 MG
25 TABLET ORAL EVERY 6 HOURS PRN
Status: DISCONTINUED | OUTPATIENT
Start: 2022-07-02 | End: 2022-07-04 | Stop reason: HOSPADM

## 2022-07-02 RX ORDER — ONDANSETRON 2 MG/ML
4 INJECTION INTRAMUSCULAR; INTRAVENOUS EVERY 8 HOURS PRN
Status: DISCONTINUED | OUTPATIENT
Start: 2022-07-02 | End: 2022-07-04 | Stop reason: HOSPADM

## 2022-07-02 RX ORDER — METOCLOPRAMIDE HYDROCHLORIDE 5 MG/ML
10 INJECTION INTRAMUSCULAR; INTRAVENOUS EVERY 6 HOURS PRN
Status: DISCONTINUED | OUTPATIENT
Start: 2022-07-02 | End: 2022-07-02

## 2022-07-02 RX ORDER — DOCUSATE SODIUM 100 MG/1
100 CAPSULE, LIQUID FILLED ORAL 2 TIMES DAILY PRN
Status: DISCONTINUED | OUTPATIENT
Start: 2022-07-02 | End: 2022-07-04 | Stop reason: HOSPADM

## 2022-07-02 RX ADMIN — SODIUM CHLORIDE, SODIUM LACTATE, POTASSIUM CHLORIDE, AND CALCIUM CHLORIDE 925 ML/HR: .6; .31; .03; .02 INJECTION, SOLUTION INTRAVENOUS at 05:40

## 2022-07-02 RX ADMIN — ONDANSETRON 4 MG: 2 INJECTION INTRAMUSCULAR; INTRAVENOUS at 05:09

## 2022-07-02 RX ADMIN — METOCLOPRAMIDE HYDROCHLORIDE 10 MG: 5 INJECTION INTRAMUSCULAR; INTRAVENOUS at 10:34

## 2022-07-02 RX ADMIN — LEVOTHYROXINE SODIUM 25 MCG: 25 TABLET ORAL at 06:07

## 2022-07-02 RX ADMIN — IBUPROFEN 600 MG: 600 TABLET, FILM COATED ORAL at 20:32

## 2022-07-02 RX ADMIN — LABETALOL HYDROCHLORIDE 200 MG: 200 TABLET, FILM COATED ORAL at 11:07

## 2022-07-02 RX ADMIN — LIDOCAINE HYDROCHLORIDE AND EPINEPHRINE 3 ML: 15; 5 INJECTION, SOLUTION EPIDURAL at 03:46

## 2022-07-02 RX ADMIN — SODIUM CHLORIDE, SODIUM LACTATE, POTASSIUM CHLORIDE, AND CALCIUM CHLORIDE 999 ML/HR: .6; .31; .03; .02 INJECTION, SOLUTION INTRAVENOUS at 03:56

## 2022-07-02 RX ADMIN — ROPIVACAINE HYDROCHLORIDE: 2 INJECTION, SOLUTION EPIDURAL; INFILTRATION at 04:00

## 2022-07-02 RX ADMIN — LIDOCAINE HYDROCHLORIDE AND EPINEPHRINE 2 ML: 15; 5 INJECTION, SOLUTION EPIDURAL at 03:50

## 2022-07-02 RX ADMIN — LORATADINE 10 MG: 10 TABLET ORAL at 16:16

## 2022-07-02 RX ADMIN — SODIUM CHLORIDE, SODIUM LACTATE, POTASSIUM CHLORIDE, AND CALCIUM CHLORIDE 125 ML/HR: .6; .31; .03; .02 INJECTION, SOLUTION INTRAVENOUS at 01:31

## 2022-07-02 RX ADMIN — SODIUM CHLORIDE, SODIUM LACTATE, POTASSIUM CHLORIDE, AND CALCIUM CHLORIDE 125 ML/HR: .6; .31; .03; .02 INJECTION, SOLUTION INTRAVENOUS at 06:44

## 2022-07-02 RX ADMIN — HYDROCORTISONE 1 APPLICATION: 10 CREAM TOPICAL at 23:00

## 2022-07-02 RX ADMIN — LABETALOL HYDROCHLORIDE 200 MG: 200 TABLET, FILM COATED ORAL at 21:30

## 2022-07-02 RX ADMIN — Medication 1 APPLICATION: at 16:22

## 2022-07-02 NOTE — OB LABOR/OXYTOCIN SAFETY PROGRESS
Oxytocin Safety Progress Check Note - Isabel Sandhoff HornePerdick 42 y o  female MRN: [id number]    Unit/Bed#: -01 Encounter: [id number]    Dose (jackie-units/min) Oxytocin: 10 jcakie-units/min  Contraction Frequency (minutes): 2-2 5  Contraction Quality: Moderate  Tachysystole: No   Cervical Dilation: 5-6        Cervical Effacement: 70  Fetal Station: 0  Baseline Rate: 155 bpm  Fetal Heart Rate: 155 BPM  FHR Category: Category I               Vital Signs:   Vitals:    07/02/22 0215   BP: 114/60   Pulse: 86   Resp:    Temp:        Notes/comments:   SVE unchanged  AROM with clear fluid appreciated  Increase pitocin to 12 mU/min  [place] q2-3m, FHT category 1          Mica Flores MD 7/2/2022 2:43 AM

## 2022-07-02 NOTE — OB LABOR/OXYTOCIN SAFETY PROGRESS
Oxytocin Safety Progress Check Note - Nila Reddy 42 y o  female MRN: [id number]    Unit/Bed#: -01 Encounter: [id number]    Dose (jackie-units/min) Oxytocin: 12 jackie-units/min  Contraction Frequency (minutes): 2-3  Contraction Quality: Moderate, Strong  Tachysystole: No   Cervical Dilation: 8        Cervical Effacement: 70  Fetal Station: 1  Baseline Rate: 140 bpm  Fetal Heart Rate: 152 BPM  FHR Category: Category I               Vital Signs:   Vitals:    07/02/22 0515   BP: 101/56   Pulse: 75   Resp:    Temp:    SpO2:        Notes/comments:   SVE 8/70/+1  FHT category 1, intermittently category 2 with subtle late decelerations that resolve with repositioning  Pitocin 12 mU/min, toco q2-3m  Patient comfortable with epidural  Continue current management          Keagan Stinson MD 7/2/2022 5:28 AM

## 2022-07-02 NOTE — DISCHARGE SUMMARY
Discharge Summary - Katarzyna Reddy 42 y o  female MRN: [id number]    Unit/Bed#: -01 Encounter: [id number]    Admission Date: 2022     Discharge Date: 22    Admitting Diagnosis:   1  Pregnancy at 38w5d  2  GBS negative  3  Chronic hypertension  4  Subclinical hypothyroidism  5  IUI pregnancy  6  Subchorionic hemorrhage  7  Advanced maternal age  6  HSV-1  9  GERD  10  COVID-19 in pregnancy     Discharge Diagnosis:   Same, delivered    Procedures:   spontaneous vaginal delivery    Admitting Attending: Dr Sumanth Barr MD*  Delivery Attending: Dr Heather Shah  Discharge Attending: Dr Liss Kiser:     Maury Cranker is a 42 y o   who was admitted at 38w4d for induction of labor for chronic hypertension  Induction course was notable for jansen balloon with cytotec for cervical ripening followed by pitocin titration  Blood pressures were controlled intrapartum without need for antihypertensives  She made continuous cervical change and received an epidural for pain control  She was AROM'd for clear moderate fluid on 22 on 0230  She proceeded to make cervical change and became complete at 1035  She started pushing at 1037  She then underwent an uncomplicated spontaneous vaginal delivery and delivered a viable male  at 56  APGARS were 8, 8 at 1 and 5 minutes, respectively   weighed 7lb 6oz  Placenta was delivered at 1153   was then transferred to  nursery  Patient tolerated the procedure well and was transferred to recovery in stable condition  The patient's post partum course was unremarkable  On day of discharge, she was ambulating and able to reasonably perform all ADLs  She was voiding and had appropriate bowel function  Pain was well controlled  She was discharged home on postpartum day #2 without complications   Patient was instructed to follow up with her OB as an outpatient and was given appropriate warnings to call provider if she develops signs of infection or uncontrolled pain  On day of discharge she was ambulating, voiding spontaneously, tolerating oral intake and hemodynamically stable  She is breast feeding   Mom's blood type is A positive  Rhogam was not given  Condition at discharge:   good     Disposition:   Home    Planned Readmission:   No    Discharge Medications:   - Please see AVS for for full list of medications upon discharge  Discharge instructions :   -Do not place anything (no partner, tampons or douche) in your vagina for 6 weeks  -You may walk for exercise for the first 6 weeks then gradually return to your usual activities    -Please do not drive for 1 week if you have no stitches and for 2 weeks if you have stitches or underwent a  delivery     -You may take baths or shower per your preference    -Please look at your bust (breasts) in the mirror daily and call provider for redness or tenderness or increased warmth  - If you have had a  please look at your incision daily as well and call provider for increasing redness or steady drainage from the incision    -Please call your provider if temperature > 100 4*F or 38* C, worsening pain or a foul discharge        Media MD Craolyn  22  11:05 AM

## 2022-07-02 NOTE — PLAN OF CARE
Problem: BIRTH - VAGINAL/ SECTION  Goal: Fetal and maternal status remain reassuring during the birth process  Description: INTERVENTIONS:  - Monitor vital signs  - Monitor fetal heart rate  - Monitor uterine activity  - Monitor labor progression (vaginal delivery)  - DVT prophylaxis  - Antibiotic prophylaxis  Outcome: Progressing  Goal: Emotionally satisfying birthing experience for mother/fetus  Description: Interventions:  - Assess, plan, implement and evaluate the nursing care given to the patient in labor  - Advocate the philosophy that each childbirth experience is a unique experience and support the family's chosen level of involvement and control during the labor process   - Actively participate in both the patient's and family's teaching of the birth process  - Consider cultural, Zoroastrian and age-specific factors and plan care for the patient in labor  Outcome: Progressing     Problem: PAIN - ADULT  Goal: Verbalizes/displays adequate comfort level or baseline comfort level  Description: Interventions:  - Encourage patient to monitor pain and request assistance  - Assess pain using appropriate pain scale  - Administer analgesics based on type and severity of pain and evaluate response  - Implement non-pharmacological measures as appropriate and evaluate response  - Consider cultural and social influences on pain and pain management  - Notify physician/advanced practitioner if interventions unsuccessful or patient reports new pain  Outcome: Progressing     Problem: INFECTION - ADULT  Goal: Absence or prevention of progression during hospitalization  Description: INTERVENTIONS:  - Assess and monitor for signs and symptoms of infection  - Monitor lab/diagnostic results  - Monitor all insertion sites, i e  indwelling lines, tubes, and drains  - Monitor endotracheal if appropriate and nasal secretions for changes in amount and color  - [place] appropriate cooling/warming therapies per order  - Administer medications as ordered  - Instruct and encourage patient and family to use good hand hygiene technique  - Identify and instruct in appropriate isolation precautions for identified infection/condition  Outcome: Progressing  Goal: Absence of fever/infection during neutropenic period  Description: INTERVENTIONS:  - Monitor WBC    Outcome: Progressing     Problem: SAFETY ADULT  Goal: Patient will remain free of falls  Description: INTERVENTIONS:  - Educate patient/family on patient safety including physical limitations  - Instruct patient to call for assistance with activity   - Consult OT/PT to assist with strengthening/mobility   - Keep Call bell within reach  - Keep bed low and locked with side rails adjusted as appropriate  - Keep care items and personal belongings within reach  - Initiate and maintain comfort rounds  - Make Fall Risk Sign visible to staff  - Offer Toileting every  Outcome: Progressing  Goal: Maintain or return to baseline ADL function  Description: INTERVENTIONS:  -  Assess patient's ability to carry out ADLs; assess patient's baseline for ADL function and identify physical deficits which impact ability to perform ADLs (bathing, care of mouth/teeth, toileting, grooming, dressing, etc )  - Assess/evaluate cause of self-care deficits   - Assess range of motion  - Assess patient's mobility; develop plan if impaired  - Assess patient's need for assistive devices and provide as appropriate  - Encourage maximum independence but intervene and supervise when necessary  - Involve family in performance of ADLs  - Assess for home care needs following discharge   - Consider OT consult to assist with ADL evaluation and planning for discharge  - Provide patient education as appropriate  Outcome: Progressing  Goal: Maintains/Returns to pre admission functional level  Description: INTERVENTIONS:  - Perform BMAT or MOVE assessment daily    - Set and communicate daily mobility goal to care team and patient/family/caregiver  - Collaborate with rehabilitation services on mobility goals if consulted  Outcome: Progressing     Problem: Knowledge Deficit  Goal: Patient/family/caregiver demonstrates understanding of disease process, treatment plan, medications, and discharge instructions  Description: Complete learning assessment and assess knowledge base    Interventions:  - Provide teaching at level of understanding  - Provide teaching via preferred learning methods  Outcome: Progressing     Problem: DISCHARGE PLANNING  Goal: Discharge to home or other facility with appropriate resources  Description: INTERVENTIONS:  - Identify barriers to discharge w/patient and caregiver  - Arrange for needed discharge resources and transportation as appropriate  - Identify discharge learning needs (meds, wound care, etc )  - Arrange for interpretive services to assist at discharge as needed  - Refer to Case Management Department for coordinating discharge planning if the patient needs post-hospital services based on physician/advanced practitioner order or complex needs related to functional status, cognitive ability, or social support system  Outcome: Progressing

## 2022-07-02 NOTE — ANESTHESIA PREPROCEDURE EVALUATION
Procedure:  LABOR ANALGESIA    Relevant Problems   ANESTHESIA   (+) PONV (postoperative nausea and vomiting)      CARDIO   (+) Chronic hypertension   (+) Chronic hypertension affecting pregnancy      ENDO   (+) Subclinical hypothyroidism      GI/HEPATIC   (+) Gastroesophageal reflux disease      GYN   (+) 38 weeks gestation of pregnancy   (+) Encounter for supervision of normal first pregnancy in third trimester        Physical Exam    Airway      TM Distance: >3 FB  Neck ROM: full     Dental   No notable dental hx     Cardiovascular      Pulmonary      Other Findings        Anesthesia Plan  ASA Score- 2     Anesthesia Type- epidural with ASA Monitors  Additional Monitors:   Airway Plan:           Plan Factors-    Chart reviewed  Existing labs reviewed  Patient summary reviewed  Induction- intravenous  Postoperative Plan-     Informed Consent- Anesthetic plan and risks discussed with patient  I personally reviewed this patient with the CRNA  Discussed and agreed on the Anesthesia Plan with the CRNA  Gerhardt Sep

## 2022-07-02 NOTE — OB LABOR/OXYTOCIN SAFETY PROGRESS
Oxytocin Safety Progress Check Note - Lily Reddy 42 y o  female MRN: [id number]    Unit/Bed#: -01 Encounter: [id number]    Dose (jackie-units/min) Oxytocin: 12 jackie-units/min  Contraction Frequency (minutes): 2-3  Contraction Quality: Moderate  Tachysystole: No   Cervical Dilation: 10  Dilation Complete Date: 07/02/22  Dilation Complete Time: 1035  Cervical Effacement: 100  Fetal Station: 2  Baseline Rate: 145 bpm  Fetal Heart Rate: 150 BPM  FHR Category: Category I      Vital Signs:   Vitals:    07/02/22 1015   BP: 129/60   Pulse: 80   Resp:    Temp:    SpO2:        Notes/comments:   Patient feeling pressure, will start to push      Dayron David MD 7/2/2022 10:39 AM

## 2022-07-02 NOTE — ANESTHESIA POSTPROCEDURE EVALUATION
Post-Op Assessment Note               Post-op block assessment: catheter intact and no complications      No complications documented      BP      Temp      Pulse     Resp      SpO2

## 2022-07-02 NOTE — OB LABOR/OXYTOCIN SAFETY PROGRESS
Oxytocin Safety Progress Check Note - Randi Reddy 42 y o  female MRN: [id number]    Unit/Bed#: -01 Encounter: [id number]    Dose (jackie-units/min) Oxytocin: 8 jackie-units/min  Contraction Frequency (minutes): 2-3  Contraction Quality: Moderate  Tachysystole: No   Cervical Dilation: 5-6        Cervical Effacement: 70  Fetal Station: 0  Baseline Rate: 140 bpm  Fetal Heart Rate: 148 BPM  FHR Category: Category I               Vital Signs:   Vitals:    07/01/22 2305   BP: 134/93   Pulse: 94   Resp:    Temp:        Notes/comments:   SVE 5 5/70/0  FHT category 1  Pitocin 8 mU/min, toco q2-3m  Patient declines AROM at this time  Continue titration as tolerated          Mihai Mishra MD 7/1/2022 11:37 PM

## 2022-07-02 NOTE — ANESTHESIA PROCEDURE NOTES
Epidural Block    Patient location during procedure: OB  Start time: 7/2/2022 3:48 AM  Reason for block: procedure for pain  Staffing  Performed: CRNA   Anesthesiologist: Marjan Jeronimo MD  Resident/CRNA: Austin Allen CRNA  Preanesthetic Checklist  Completed: patient identified, IV checked, site marked, risks and benefits discussed, surgical consent, monitors and equipment checked, pre-op evaluation and timeout performed  Epidural  Patient position: sitting  Prep: ChloraPrep  Patient monitoring: continuous pulse ox, frequent blood pressure checks and heart rate  Approach: midline  Location: lumbar  Injection technique: JOSE saline  Needle  Needle type: Tuohy   Needle gauge: 18 G  Catheter type: end hole  Catheter size: 20 G  Catheter at skin depth: 11 cm  Test dose: negative  Assessment  Sensory level: T10  Number of attempts: 2negative aspiration for CSF, no paresthesia on injection and negative aspiration for heme  patient tolerated the procedure well with no immediate complications  Additional Notes  Needle stick x 2, needed to go up one level, mild paresthesia on first attempt on to place epidural catheter, needle repositioned and successful placement of catheter

## 2022-07-02 NOTE — OB LABOR/OXYTOCIN SAFETY PROGRESS
Oxytocin Safety Progress Check Note - Brian Tariq Reddy 42 y o  female MRN: [id number]    Unit/Bed#: -01 Encounter: [id number]    Dose (jackie-units/min) Oxytocin: 6 jackie-units/min  Contraction Frequency (minutes): 2-3  Contraction Quality: Moderate  Tachysystole: No   Cervical Dilation: 5        Cervical Effacement: 70  Fetal Station: 0  Baseline Rate: 140 bpm  Fetal Heart Rate: 141 BPM  FHR Category: Category I               Vital Signs:   Vitals:    07/01/22 1900   BP: 128/67   Pulse:    Resp:    Temp:        Notes/comments:   SVE 5/70/0  FHT category 1  Pitocin 6 mU/min, toco q2-3m  Continue titration as tolerated  Continue AROM at next check          Nydia Lincoln MD 7/1/2022 8:35 PM

## 2022-07-02 NOTE — LACTATION NOTE
This note was copied from a baby's chart  CONSULT - LACTATION  Baby Boy Opal Comber) HornArvinddick 0 days male MRN: [id number]    [address] Room / Bed: (N)/(N) Encounter: [id number]    Maternal Information     MOTHER:  Albino Navarrete  Maternal Age: 42 y o    OB History: # 1 - Date: 22, Sex: Male, Weight: 3345 g (7 lb 6 oz), GA: 38w5d, Delivery: Vaginal, Spontaneous, Apgar1: 8, Apgar5: 8, Living: Living, Birth Comments: None   Previouse breast reduction surgery? No    Lactation history:   Has patient previously breast fed: No   How long had patient previously breast fed:     Previous breast feeding complications:       Past Surgical History:   Procedure Laterality Date    HAND SURGERY Left     ganglion cyst    [place] LARYNGOSCOPY,DIRECT,SCOPE,INJ CORDS N/A 4/10/2017    Procedure: MICROLARYNGOSCOPY BILATERAL VOCAL FOLD INJECTION (DECADRON); Surgeon: Colleen Camacho MD;  [facility];  Service: ENT    REFRACTIVE SURGERY      WISDOM TOOTH EXTRACTION          Birth information:  YOB: 2020   Time of birth: 11:50 AM   Sex: male   Delivery type: Vaginal, Spontaneous   Birth Weight: 3345 g (7 lb 6 oz)   Percent of Weight Change: 0%     Gestational Age: 44w7d   [unfilled]    Assessment  Breast and nipple assessment: Flat nipples, tabitha with stimulation      [place] Assessment: normal assessment    Feeding assessment: latch difficulty (having difficulty achieving a deep latch )     LATCH:  Latch: Repeated attempts, hold nipple in mouth, stimulate to suck   Audible Swallowing: A few with stimulation   Type of Nipple: Flat (everts slightly with latch assist)   Comfort (Breast/Nipple): Soft/non-tender   Hold (Positioning): Partial assist, teach one side, mother does other, staff holds   DEPAU CENTER Score: 6          Feeding recommendations:  breast feed on demand     Met with parents and provided them with the Ready Set Baby and the Discharge Breastfeeding Booklets and reviewed information  Discussed Skin to Skin contact and benefits to mom and baby  Feeding cues and what that means for recognizing infant's hunger reviewed  Avoidance of pacifiers for the first month discussed  Talked about exclusive breastfeeding for the first 6 months  Positioning and latch reviewed as well as showing images of other feeding positions  Discussed the properties of a good latch in any position  Reviewed hand/manual expression  Farhana Hawk has flat nipples that tabitha with latch assist,  Worked on positioning infant up at chest level on left breast, aligning nose to nipple and dragging nipple down to chin to achieve a wide deep latch  Reminded mom to bring baby to breast and not breast to baby ( no hunching)  Then used areolar compression to try to achieve a deep latch that was comfortable and would exchange optimum amounts of colostrum  Stressed importance of good alignment ( baby's ear, shoulder and hip in a straight line )  Latch was not achieved at this time  Nurse did states that baby did latch on the right side prior to this attempt  Gave information on common concerns, what to expect the first few weeks after delivery, preparing for other caregivers, and how partners can help  Resources for support also provided  Also went over the discharge breastfeeding booklet including the feeding log  Emphasized 8 or more (12) feedings in a 24 hour period, what to expect for the number of diapers per day of life and the progression of properties of the  stooling pattern  Reviewed breastfeeding and your lifestyle, storage and preparation of breast milk, how to keep you breast pump clean, the employed breastfeeding mother and paced bottle feeding handouts  Booklet included Breastfeeding Resources for after discharge including access to the number for the [address] for follow up breastfeeding support as needed      Encouraged Russel Alvarez to call for breastfeeding assistance as needed          Yarelis Cuevas RN 7/2/2022 4:27 PM

## 2022-07-02 NOTE — OB LABOR/OXYTOCIN SAFETY PROGRESS
Oxytocin Safety Progress Check Note - Cheyenne Reddy 42 y o  female MRN: [id number]    Unit/Bed#: -01 Encounter: [id number]    Dose (jackie-units/min) Oxytocin: 12 jackie-units/min  Contraction Frequency (minutes): 2 5-4  Contraction Quality: Moderate  Tachysystole: No   Cervical Dilation: Lip/rim (Comment)        Cervical Effacement: 90  Fetal Station: 1  Baseline Rate: 140 bpm  Fetal Heart Rate: 140 BPM  FHR Category: Category II   FHT: 130's moderate variability, occasional variable, occasional late, accelerations      Vital Signs:   Vitals:    07/02/22 0800   BP: 136/80   Pulse: 96   Resp:    Temp:    SpO2:        Notes/comments:   Patient nausea improved, some low back discomfort, improved with repositioning, continue to moniter and manage    Emil Mayer MD 7/2/2022 8:35 AM

## 2022-07-02 NOTE — L&D DELIVERY NOTE
Delivery Note    Obstetrician:   Carlos Corea    Assistant: Komal Garner    Pre-Delivery Diagnosis: Term pregnancy, Induced labor, Single fetus and cHTN    Post-Delivery Diagnosis: Same as above - Delivered or 1st degree laceration    Procedure: Repair first degree spontaneous laceration, SAVD     Episiotomy: none    Laceration: 1st degree    Estimated Blood Loss:   mL           Complications:  None    Venous ph: 7 36  BE -2 5  Arterial Ph: 7 302  BE -4 3    Details: Patient delivered a viable Male  over first degree laceration  Baby presented occiput anterior reconstituted to LOT  Left posterior hand was presenting so with gentle upward pressure maternal effort the left posterior shoulder was delivered, then with maternal effort and gentle downward pressure the right anterior shoulder was delivered followed by the body  After delivery of the  and appropriate delay, the umbilical cord was doubly clamped and cut and the  was passed to staff for routine care  Umbilical cord blood and umbilical artery and venous gases were collected  Active management of the third stage of labor was undertaken with IV pitocin  Bleeding was noted to be under control  Placenta delivered intact with 3-v cord and trailing membranes  Inspection of the perineum revealed the above which was then repaired in standard fashion with 3-0 Vicryl rapid  The lacerations showed good tissue reapproximation and hemostasis  Uterus was massaged and cleared of clot also showed excellent hemostasis  Mother and baby are currently recovering nicely in stable condition             Attending Attestation: I was present for the entire procedure

## 2022-07-02 NOTE — PLAN OF CARE
Problem: PAIN - ADULT  Goal: Verbalizes/displays adequate comfort level or baseline comfort level  Description: Interventions:  - Encourage patient to monitor pain and request assistance  - Assess pain using appropriate pain scale  - Administer analgesics based on type and severity of pain and evaluate response  - Implement non-pharmacological measures as appropriate and evaluate response  - Consider cultural and social influences on pain and pain management  - Notify physician/advanced practitioner if interventions unsuccessful or patient reports new pain  Outcome: Progressing     Problem: INFECTION - ADULT  Goal: Absence or prevention of progression during hospitalization  Description: INTERVENTIONS:  - Assess and monitor for signs and symptoms of infection  - Monitor lab/diagnostic results  - Monitor all insertion sites, i e  indwelling lines, tubes, and drains  - Monitor endotracheal if appropriate and nasal secretions for changes in amount and color  - [place] appropriate cooling/warming therapies per order  - Administer medications as ordered  - Instruct and encourage patient and family to use good hand hygiene technique  - Identify and instruct in appropriate isolation precautions for identified infection/condition  Outcome: Progressing  Goal: Absence of fever/infection during neutropenic period  Description: INTERVENTIONS:  - Monitor WBC    Outcome: Progressing     Problem: SAFETY ADULT  Goal: Patient will remain free of falls  Description: INTERVENTIONS:  - Educate patient/family on patient safety including physical limitations  - Instruct patient to call for assistance with activity   - Consult OT/PT to assist with strengthening/mobility   - Keep Call bell within reach  - Keep bed low and locked with side rails adjusted as appropriate  - Keep care items and personal belongings within reach  - Initiate and maintain comfort rounds  - Make Fall Risk Sign visible to staff  - Offer Toileting every  Outcome: Progressing  Goal: Maintain or return to baseline ADL function  Description: INTERVENTIONS:  -  Assess patient's ability to carry out ADLs; assess patient's baseline for ADL function and identify physical deficits which impact ability to perform ADLs (bathing, care of mouth/teeth, toileting, grooming, dressing, etc )  - Assess/evaluate cause of self-care deficits   - Assess range of motion  - Assess patient's mobility; develop plan if impaired  - Assess patient's need for assistive devices and provide as appropriate  - Encourage maximum independence but intervene and supervise when necessary  - Involve family in performance of ADLs  - Assess for home care needs following discharge   - Consider OT consult to assist with ADL evaluation and planning for discharge  - Provide patient education as appropriate  Outcome: Progressing  Goal: Maintains/Returns to pre admission functional level  Description: INTERVENTIONS:  - Perform BMAT or MOVE assessment daily    - Set and communicate daily mobility goal to care team and patient/family/caregiver  - Collaborate with rehabilitation services on mobility goals if consulted  Outcome: Progressing     Problem: Knowledge Deficit  Goal: Patient/family/caregiver demonstrates understanding of disease process, treatment plan, medications, and discharge instructions  Description: Complete learning assessment and assess knowledge base    Interventions:  - Provide teaching at level of understanding  - Provide teaching via preferred learning methods  Outcome: Progressing     Problem: DISCHARGE PLANNING  Goal: Discharge to home or other facility with appropriate resources  Description: INTERVENTIONS:  - Identify barriers to discharge w/patient and caregiver  - Arrange for needed discharge resources and transportation as appropriate  - Identify discharge learning needs (meds, wound care, etc )  - Arrange for interpretive services to assist at discharge as needed  - Refer to Case Management Department for coordinating discharge planning if the patient needs post-hospital services based on physician/advanced practitioner order or complex needs related to functional status, cognitive ability, or social support system  Outcome: Progressing     Problem: POSTPARTUM  Goal: Experiences normal postpartum course  Description: INTERVENTIONS:  - Monitor maternal vital signs  - Assess uterine involution and lochia  Outcome: Progressing  Goal: Appropriate maternal -  bonding  Description: INTERVENTIONS:  - Identify family support  - Assess for appropriate maternal/infant bonding   -Encourage maternal/infant bonding opportunities  - Referral to  or  as needed  Outcome: Progressing  Goal: Establishment of infant feeding pattern  Description: INTERVENTIONS:  - Assess breast/bottle feeding  - Refer to lactation as needed  Outcome: Progressing  Goal: Incision(s), wounds(s) or drain site(s) healing without S/S of infection  Description: INTERVENTIONS  - Assess and document dressing, incision, wound bed, drain sites and surrounding tissue  - Provide patient and family education  - Perform skin care/dressing changes every   Outcome: Progressing     Problem: BIRTH - VAGINAL/ SECTION  Goal: Fetal and maternal status remain reassuring during the birth process  Description: INTERVENTIONS:  - Monitor vital signs  - Monitor fetal heart rate  - Monitor uterine activity  - Monitor labor progression (vaginal delivery)  - DVT prophylaxis  - Antibiotic prophylaxis  Outcome: Completed  Goal: Emotionally satisfying birthing experience for mother/fetus  Description: Interventions:  - Assess, plan, implement and evaluate the nursing care given to the patient in labor  - Advocate the philosophy that each childbirth experience is a unique experience and support the family's chosen level of involvement and control during the labor process   - Actively participate in both the patient's and family's teaching of the birth process  - Consider cultural, [place] and age-specific factors and plan care for the patient in labor  Outcome: Completed

## 2022-07-03 PROCEDURE — 99024 POSTOP FOLLOW-UP VISIT: CPT | Performed by: OBSTETRICS & GYNECOLOGY

## 2022-07-03 RX ADMIN — IBUPROFEN 600 MG: 600 TABLET, FILM COATED ORAL at 09:03

## 2022-07-03 RX ADMIN — LEVOTHYROXINE SODIUM 25 MCG: 25 TABLET ORAL at 06:04

## 2022-07-03 RX ADMIN — LORATADINE 10 MG: 10 TABLET ORAL at 09:03

## 2022-07-03 RX ADMIN — IBUPROFEN 600 MG: 600 TABLET, FILM COATED ORAL at 16:21

## 2022-07-03 RX ADMIN — LABETALOL HYDROCHLORIDE 200 MG: 200 TABLET, FILM COATED ORAL at 20:44

## 2022-07-03 RX ADMIN — DOCUSATE SODIUM 100 MG: 100 CAPSULE, LIQUID FILLED ORAL at 18:34

## 2022-07-03 RX ADMIN — LABETALOL HYDROCHLORIDE 200 MG: 200 TABLET, FILM COATED ORAL at 09:02

## 2022-07-03 NOTE — ASSESSMENT & PLAN NOTE
PPD#1  Continue routine post partum care  Encourage ambulation  Encourage breastfeeding  Contraception: Declines  Anticipate discharge PPD#1 vs #2

## 2022-07-03 NOTE — CASE MANAGEMENT
Case Management Discharge Planning Note    Patient name Josefina Schroeder  Location /-99 MRN [id number]  : 1975 Date 7/3/2022       Current Admission Date: 2022  Current Admission Diagnosis: (spontaneous vaginal delivery)   Patient Active Problem List    Diagnosis Date Noted     (spontaneous vaginal delivery) 2022    PONV (postoperative nausea and vomiting)     Chronic hypertension 2022    HSV-1 (herpes simplex virus 1) infection 2022    Gallbladder polyp 2022    Upper abdominal pain 2022    Gastroesophageal reflux disease 2022    AMA (advanced maternal age) primigravida 33+, third trimester 2022    Infertility, female 2022    Subchorionic hematoma in first trimester 2021    38 weeks gestation of pregnancy 2021    Subclinical hypothyroidism 2021    COVID-19 affecting pregnancy in first trimester 2021    Encounter for supervision of normal first pregnancy in third trimester 2021    Chronic hypertension affecting pregnancy 2021      LOS (days): 2  Geometric Mean LOS (GMLOS) (days):   Days to GMLOS:     OBJECTIVE:  Risk of Unplanned Readmission Score: 4 85         Current admission status: Inpatient   Preferred Pharmacy:   [facility]/pharmacy #[id number] [address]  Phone: [phone] Fax: [phone]    Primary Care Provider: Eren West MD    Primary Insurance: BLUE CROSS  Secondary Insurance:     DISCHARGE DETAILS:    Discharge planning discussed with[de-identified] Patient  Freedom of Choice: Yes  Comments - Freedom of Choice: In response to a CM consult for living will information, CM met with the Pt and provided the requested living will information  No other CM needs at this time         Requested  [address]         Is the patient interested in [facility] at discharge?: No    DME Referral Provided  Referral made for DME?: No      Treatment Team Recommendation: Home  Discharge Destination Plan[de-identified] Home  Transport at Discharge : Family

## 2022-07-03 NOTE — PROGRESS NOTES
Progress Note - OB/GYN   Oscar Reddy 42 y o  female MRN: [id number]  Unit/Bed#: -01 Encounter: [id number]    Assessment:  Post partum Day #1 s/p , stable, baby in room    Plan:  Subclinical hypothyroidism  Assessment & Plan  Continue Levothyroxine 25 mcg     38 weeks gestation of pregnancy  Assessment & Plan  Plan for jansen balloon and PO cytotec for cervical ripening  Will start Pitocin when able  Epidural at pt request  Prenatal labs reviewed  Admission labs pending    Now s/p       Chronic hypertension affecting pregnancy  Assessment & Plan  Continue home dose labetalol 200 mg BID  CBC, CMP, P/C pending  Continue to monitor blood pressures  Systolic (69FDW), GIV:726 , Min:99 , MOA:802   Diastolic (53JMI), BKP:52, Min:51, Max:94  No acute treatment of blood pressures    *  (spontaneous vaginal delivery)  Assessment & Plan  PPD#1  Continue routine post partum care  Encourage ambulation  Encourage breastfeeding  Contraception: Declines  Anticipate discharge PPD#1 vs #2        Subjective/Objective   Chief Complaint:     Post delivery  Patient is doing well  Lochia WNL  Pain well controlled  Subjective:     Pain: yes, cramping, improved with meds  Tolerating PO: yes  Voiding: yes  Flatus: yes  Ambulating: yes  Chest pain: no  Shortness of breath: no  Leg pain: no  Lochia: minimal    Objective:     Vitals: /53 (BP Location: Left arm)   Pulse 70   Temp 97 9 °F (36 6 °C) (Oral)   Resp 18   Ht 5' 4" (1 626 m)   Wt 92 5 kg (204 lb)   LMP 10/07/2021   SpO2 98%   Breastfeeding Yes   BMI 35 02 kg/m²     I/O        0701  07/ 0700  0701  07/03 0700    P  O  200     I V  (mL/kg) 3050 9 (33)     Total Intake(mL/kg) 3250 9 (35 1)     Urine (mL/kg/hr) 1600 900 (0 4)    Blood  103    Total Output 1600 1003    Net +1650 9 -1003          Unmeasured Urine Occurrence 1 x           Lab Results   Component Value Date    WBC 8 49 2022    HGB 14 4 2022    HCT 42 6 07/01/2022    MCV 87 07/01/2022     07/01/2022       Physical Exam:     Gen: AAOx3, NAD  CV: no acute distress  Lungs: no acute distress  Abd: Soft, non-tender, non-distended, no rebound or guarding  Uterine fundus firm and non-tender, 1 cm below the umbilicus    Ext: Non tender    Anabel Mina MD  OBGYN PGY-2  7/3/2022  3:11 AM

## 2022-07-03 NOTE — PLAN OF CARE
Problem: PAIN - ADULT  Goal: Verbalizes/displays adequate comfort level or baseline comfort level  Description: Interventions:  - Encourage patient to monitor pain and request assistance  - Assess pain using appropriate pain scale  - Administer analgesics based on type and severity of pain and evaluate response  - Implement non-pharmacological measures as appropriate and evaluate response  - Consider cultural and social influences on pain and pain management  - Notify physician/advanced practitioner if interventions unsuccessful or patient reports new pain  Outcome: Progressing     Problem: INFECTION - ADULT  Goal: Absence or prevention of progression during hospitalization  Description: INTERVENTIONS:  - Assess and monitor for signs and symptoms of infection  - Monitor lab/diagnostic results  - Monitor all insertion sites, i e  indwelling lines, tubes, and drains  - Monitor endotracheal if appropriate and nasal secretions for changes in amount and color  - [place] appropriate cooling/warming therapies per order  - Administer medications as ordered  - Instruct and encourage patient and family to use good hand hygiene technique  - Identify and instruct in appropriate isolation precautions for identified infection/condition  Outcome: Progressing  Goal: Absence of fever/infection during neutropenic period  Description: INTERVENTIONS:  - Monitor WBC    Outcome: Progressing     Problem: SAFETY ADULT  Goal: Patient will remain free of falls  Description: INTERVENTIONS:  - Educate patient/family on patient safety including physical limitations  - Instruct patient to call for assistance with activity   - Consult OT/PT to assist with strengthening/mobility   - Keep Call bell within reach  - Keep bed low and locked with side rails adjusted as appropriate  - Keep care items and personal belongings within reach  - Initiate and maintain comfort rounds  - Make Fall Risk Sign visible to staff  - Offer Toileting every  Outcome: Progressing  Goal: Maintain or return to baseline ADL function  Description: INTERVENTIONS:  -  Assess patient's ability to carry out ADLs; assess patient's baseline for ADL function and identify physical deficits which impact ability to perform ADLs (bathing, care of mouth/teeth, toileting, grooming, dressing, etc )  - Assess/evaluate cause of self-care deficits   - Assess range of motion  - Assess patient's mobility; develop plan if impaired  - Assess patient's need for assistive devices and provide as appropriate  - Encourage maximum independence but intervene and supervise when necessary  - Involve family in performance of ADLs  - Assess for home care needs following discharge   - Consider OT consult to assist with ADL evaluation and planning for discharge  - Provide patient education as appropriate  Outcome: Progressing  Goal: Maintains/Returns to pre admission functional level  Description: INTERVENTIONS:  - Perform BMAT or MOVE assessment daily    - Set and communicate daily mobility goal to care team and patient/family/caregiver  - Collaborate with rehabilitation services on mobility goals if consulted  Outcome: Progressing     Problem: Knowledge Deficit  Goal: Patient/family/caregiver demonstrates understanding of disease process, treatment plan, medications, and discharge instructions  Description: Complete learning assessment and assess knowledge base    Interventions:  - Provide teaching at level of understanding  - Provide teaching via preferred learning methods  Outcome: Progressing     Problem: DISCHARGE PLANNING  Goal: Discharge to home or other facility with appropriate resources  Description: INTERVENTIONS:  - Identify barriers to discharge w/patient and caregiver  - Arrange for needed discharge resources and transportation as appropriate  - Identify discharge learning needs (meds, wound care, etc )  - Arrange for interpretive services to assist at discharge as needed  - Refer to Case Management Department for coordinating discharge planning if the patient needs post-hospital services based on physician/advanced practitioner order or complex needs related to functional status, cognitive ability, or social support system  Outcome: Progressing     Problem: POSTPARTUM  Goal: Experiences normal postpartum course  Description: INTERVENTIONS:  - Monitor maternal vital signs  - Assess uterine involution and lochia  Outcome: Progressing  Goal: Appropriate maternal -  bonding  Description: INTERVENTIONS:  - Identify family support  - Assess for appropriate maternal/infant bonding   -Encourage maternal/infant bonding opportunities  - Referral to  or  as needed  Outcome: Progressing  Goal: Establishment of infant feeding pattern  Description: INTERVENTIONS:  - Assess breast/bottle feeding  - Refer to lactation as needed  Outcome: Progressing  Goal: Incision(s), wounds(s) or drain site(s) healing without S/S of infection  Description: INTERVENTIONS  - Assess and document dressing, incision, wound bed, drain sites and surrounding tissue  - Provide patient and family education  Outcome: Progressing

## 2022-07-03 NOTE — PLAN OF CARE
Problem: PAIN - ADULT  Goal: Verbalizes/displays adequate comfort level or baseline comfort level  Description: Interventions:  - Encourage patient to monitor pain and request assistance  - Assess pain using appropriate pain scale  - Administer analgesics based on type and severity of pain and evaluate response  - Implement non-pharmacological measures as appropriate and evaluate response  - Consider cultural and social influences on pain and pain management  - Notify physician/advanced practitioner if interventions unsuccessful or patient reports new pain  Outcome: Progressing     Problem: INFECTION - ADULT  Goal: Absence or prevention of progression during hospitalization  Description: INTERVENTIONS:  - Assess and monitor for signs and symptoms of infection  - Monitor lab/diagnostic results  - Monitor all insertion sites, i e  indwelling lines, tubes, and drains  - Monitor endotracheal if appropriate and nasal secretions for changes in amount and color  - [place] appropriate cooling/warming therapies per order  - Administer medications as ordered  - Instruct and encourage patient and family to use good hand hygiene technique  - Identify and instruct in appropriate isolation precautions for identified infection/condition  Outcome: Progressing  Goal: Absence of fever/infection during neutropenic period  Description: INTERVENTIONS:  - Monitor WBC    Outcome: Progressing     Problem: SAFETY ADULT  Goal: Patient will remain free of falls  Description: INTERVENTIONS:  - Educate patient/family on patient safety including physical limitations  - Instruct patient to call for assistance with activity   - Consult OT/PT to assist with strengthening/mobility   - Keep Call bell within reach  - Keep bed low and locked with side rails adjusted as appropriate  - Keep care items and personal belongings within reach  - Initiate and maintain comfort rounds  - Make Fall Risk Sign visible to staff  - Offer Toileting every  Outcome: Progressing  Goal: Maintain or return to baseline ADL function  Description: INTERVENTIONS:  -  Assess patient's ability to carry out ADLs; assess patient's baseline for ADL function and identify physical deficits which impact ability to perform ADLs (bathing, care of mouth/teeth, toileting, grooming, dressing, etc )  - Assess/evaluate cause of self-care deficits   - Assess range of motion  - Assess patient's mobility; develop plan if impaired  - Assess patient's need for assistive devices and provide as appropriate  - Encourage maximum independence but intervene and supervise when necessary  - Involve family in performance of ADLs  - Assess for home care needs following discharge   - Consider OT consult to assist with ADL evaluation and planning for discharge  - Provide patient education as appropriate  Outcome: Progressing  Goal: Maintains/Returns to pre admission functional level  Description: INTERVENTIONS:  - Perform BMAT or MOVE assessment daily    - Set and communicate daily mobility goal to care team and patient/family/caregiver  - Collaborate with rehabilitation services on mobility goals if consulted  Outcome: Progressing     Problem: Knowledge Deficit  Goal: Patient/family/caregiver demonstrates understanding of disease process, treatment plan, medications, and discharge instructions  Description: Complete learning assessment and assess knowledge base    Interventions:  - Provide teaching at level of understanding  - Provide teaching via preferred learning methods  Outcome: Progressing     Problem: DISCHARGE PLANNING  Goal: Discharge to home or other facility with appropriate resources  Description: INTERVENTIONS:  - Identify barriers to discharge w/patient and caregiver  - Arrange for needed discharge resources and transportation as appropriate  - Identify discharge learning needs (meds, wound care, etc )  - Arrange for interpretive services to assist at discharge as needed  - Refer to Case Management Department for coordinating discharge planning if the patient needs post-hospital services based on physician/advanced practitioner order or complex needs related to functional status, cognitive ability, or social support system  Outcome: Progressing     Problem: POSTPARTUM  Goal: Experiences normal postpartum course  Description: INTERVENTIONS:  - Monitor maternal vital signs  - Assess uterine involution and lochia  Outcome: Progressing  Goal: Appropriate maternal -  bonding  Description: INTERVENTIONS:  - Identify family support  - Assess for appropriate maternal/infant bonding   -Encourage maternal/infant bonding opportunities  - Referral to  or  as needed  Outcome: Progressing  Goal: Establishment of infant feeding pattern  Description: INTERVENTIONS:  - Assess breast/bottle feeding  - Refer to lactation as needed  Outcome: Progressing  Goal: Incision(s), wounds(s) or drain site(s) healing without S/S of infection  Description: INTERVENTIONS  - Assess and document dressing, incision, wound bed, drain sites and surrounding tissue  - Provide patient and family education  Outcome: Progressing

## 2022-07-04 VITALS
RESPIRATION RATE: 20 BRPM | HEIGHT: 64 IN | DIASTOLIC BLOOD PRESSURE: 78 MMHG | HEART RATE: 71 BPM | OXYGEN SATURATION: 98 % | TEMPERATURE: 98 F | WEIGHT: 204 LBS | BODY MASS INDEX: 34.83 KG/M2 | SYSTOLIC BLOOD PRESSURE: 141 MMHG

## 2022-07-04 LAB — RPR SER QL: NORMAL

## 2022-07-04 PROCEDURE — 99024 POSTOP FOLLOW-UP VISIT: CPT | Performed by: OBSTETRICS & GYNECOLOGY

## 2022-07-04 RX ORDER — ACETAMINOPHEN 325 MG/1
650 TABLET ORAL EVERY 6 HOURS PRN
Refills: 0
Start: 2022-07-04

## 2022-07-04 RX ORDER — IBUPROFEN 600 MG/1
600 TABLET ORAL EVERY 6 HOURS PRN
Qty: 30 TABLET | Refills: 0
Start: 2022-07-04

## 2022-07-04 RX ADMIN — LORATADINE 10 MG: 10 TABLET ORAL at 08:08

## 2022-07-04 RX ADMIN — IBUPROFEN 600 MG: 600 TABLET, FILM COATED ORAL at 08:08

## 2022-07-04 RX ADMIN — LEVOTHYROXINE SODIUM 25 MCG: 25 TABLET ORAL at 08:08

## 2022-07-04 RX ADMIN — LABETALOL HYDROCHLORIDE 200 MG: 200 TABLET, FILM COATED ORAL at 08:07

## 2022-07-04 NOTE — PROGRESS NOTES
Progress Note - OB/GYN   Lily Reddy 42 y o  female MRN: [id number]  Unit/Bed#:  324-01 Encounter: [id number]    Assessment:  PP/POD#2 s/p Spontaneous Vaginal Delivery, improving    Plan:  1  S/p   - Meeting postpartum milestones including ambulating, voiding, tolerating PO Intake, passing gas and bowel movements  - Anticipate discharge home today 22    2  CHTN  - 24 hr BP range 130s-140s/70s-80s (single elevated BP in past 24 hours)  - Continue home labetalol 200 mg BID  - Asymptomatic  - CBC/CMP/UPC wnl    3  Hypothyroidism  - Continue home levothyroxine 25 mcg qd    Subjective/Objective   Chief Complaint:     PP/POD#2 s/p Spontaneous Vaginal Delivery    Subjective:     Pain: no  Tolerating PO: yes  Voiding: yes  Flatus: yes  BM: yes  Ambulating: yes  Breastfeeding: Breastfeeding  Chest pain: no  Shortness of breath: no  Leg pain: no  Lochia: wnl    Objective:     Vitals:  Vitals:    22 1601 22 2038 22 0000 22 0700   BP: 132/81 133/73 123/74 141/78   BP Location: Left arm Left arm Left arm    Pulse: 86 83 82 71   Resp: 18 18 18 20   Temp: 98 °F (36 7 °C) 98 7 °F (37 1 °C) 99 °F (37 2 °C) 98 °F (36 7 °C)   TempSrc: Oral Oral Oral Oral   SpO2:       Weight:       Height:           Physical Exam:     Physical Exam  Constitutional:       General: She is not in acute distress  Appearance: Normal appearance  She is not ill-appearing  HENT:      Mouth/Throat:      Mouth: Mucous membranes are moist    Eyes:      Extraocular Movements: Extraocular movements intact  Cardiovascular:      Rate and Rhythm: Normal rate  Pulmonary:      Effort: Pulmonary effort is normal    Abdominal:      General: There is no distension  Palpations: Abdomen is soft  Tenderness: There is no abdominal tenderness  Musculoskeletal:         General: No swelling  Right lower leg: No edema  Left lower leg: No edema  Skin:     General: Skin is warm and dry  Coloration: Skin is not jaundiced or pale  Neurological:      General: No focal deficit present  Mental Status: She is alert  Psychiatric:         Mood and Affect: Mood normal          Behavior: Behavior normal          Thought Content: Thought content normal          Judgment: Judgment normal        Uterine fundus firm and non-tender, -2 cm below the umbilicus       Lab, Imaging and other studies: I have personally reviewed pertinent reports        Lab Results   Component Value Date    WBC 8 49 07/01/2022    HGB 14 4 07/01/2022    HCT 42 6 07/01/2022    MCV 87 07/01/2022     07/01/2022               Radha Omalley MD  07/04/22

## 2022-07-04 NOTE — PLAN OF CARE
Problem: POSTPARTUM  Goal: Experiences normal postpartum course  Description: INTERVENTIONS:  - Monitor maternal vital signs  - Assess uterine involution and lochia  Outcome: Progressing  Goal: Appropriate maternal -  bonding  Description: INTERVENTIONS:  - Identify family support  - Assess for appropriate maternal/infant bonding   -Encourage maternal/infant bonding opportunities  - Referral to  or  as needed  Outcome: Progressing  Goal: Establishment of infant feeding pattern  Description: INTERVENTIONS:  - Assess breast/bottle feeding  - Refer to lactation as needed  Outcome: Progressing  Goal: Incision(s), wounds(s) or drain site(s) healing without S/S of infection  Description: INTERVENTIONS  - Assess and document dressing, incision, wound bed, drain sites and surrounding tissue  - Provide patient and family education  - Perform skin care/dressing changes every  Outcome: Progressing     Problem: PAIN - ADULT  Goal: Verbalizes/displays adequate comfort level or baseline comfort level  Description: Interventions:  - Encourage patient to monitor pain and request assistance  - Assess pain using appropriate pain scale  - Administer analgesics based on type and severity of pain and evaluate response  - Implement non-pharmacological measures as appropriate and evaluate response  - Consider cultural and social influences on pain and pain management  - Notify physician/advanced practitioner if interventions unsuccessful or patient reports new pain  Outcome: Progressing     Problem: INFECTION - ADULT  Goal: Absence or prevention of progression during hospitalization  Description: INTERVENTIONS:  - Assess and monitor for signs and symptoms of infection  - Monitor lab/diagnostic results  - Monitor all insertion sites, i e  indwelling lines, tubes, and drains  - Monitor endotracheal if appropriate and nasal secretions for changes in amount and color  - [place] appropriate cooling/warming therapies per order  - Administer medications as ordered  - Instruct and encourage patient and family to use good hand hygiene technique  - Identify and instruct in appropriate isolation precautions for identified infection/condition  Outcome: Progressing  Goal: Absence of fever/infection during neutropenic period  Description: INTERVENTIONS:  - Monitor WBC    Outcome: Progressing     Problem: SAFETY ADULT  Goal: Patient will remain free of falls  Description: INTERVENTIONS:  - Educate patient/family on patient safety including physical limitations  - Instruct patient to call for assistance with activity   - Consult OT/PT to assist with strengthening/mobility   - Keep Call bell within reach  - Keep bed low and locked with side rails adjusted as appropriate  - Keep care items and personal belongings within reach  - Initiate and maintain comfort rounds  - Make Fall Risk Sign visible to staff  - Offer Toileting every  Outcome: Progressing  Goal: Maintain or return to baseline ADL function  Description: INTERVENTIONS:  -  Assess patient's ability to carry out ADLs; assess patient's baseline for ADL function and identify physical deficits which impact ability to perform ADLs (bathing, care of mouth/teeth, toileting, grooming, dressing, etc )  - Assess/evaluate cause of self-care deficits   - Assess range of motion  - Assess patient's mobility; develop plan if impaired  - Assess patient's need for assistive devices and provide as appropriate  - Encourage maximum independence but intervene and supervise when necessary  - Involve family in performance of ADLs  - Assess for home care needs following discharge   - Consider OT consult to assist with ADL evaluation and planning for discharge  - Provide patient education as appropriate  Outcome: Progressing  Goal: Maintains/Returns to pre admission functional level  Description: INTERVENTIONS:  - Perform BMAT or MOVE assessment daily    - Set and communicate daily mobility goal to care team and patient/family/caregiver  - Collaborate with rehabilitation services on mobility goals if consulted  Outcome: Progressing     Problem: Knowledge Deficit  Goal: Patient/family/caregiver demonstrates understanding of disease process, treatment plan, medications, and discharge instructions  Description: Complete learning assessment and assess knowledge base    Interventions:  - Provide teaching at level of understanding  - Provide teaching via preferred learning methods  Outcome: Progressing     Problem: DISCHARGE PLANNING  Goal: Discharge to home or other facility with appropriate resources  Description: INTERVENTIONS:  - Identify barriers to discharge w/patient and caregiver  - Arrange for needed discharge resources and transportation as appropriate  - Identify discharge learning needs (meds, wound care, etc )  - Arrange for interpretive services to assist at discharge as needed  - Refer to Case Management Department for coordinating discharge planning if the patient needs post-hospital services based on physician/advanced practitioner order or complex needs related to functional status, cognitive ability, or social support system  Outcome: Progressing

## 2022-07-04 NOTE — PLAN OF CARE
Problem: POSTPARTUM  Goal: Experiences normal postpartum course  Description: INTERVENTIONS:  - Monitor maternal vital signs  - Assess uterine involution and lochia  Outcome: Adequate for Discharge  Goal: Appropriate maternal -  bonding  Description: INTERVENTIONS:  - Identify family support  - Assess for appropriate maternal/infant bonding   -Encourage maternal/infant bonding opportunities  - Referral to  or  as needed  Outcome: Adequate for Discharge  Goal: Establishment of infant feeding pattern  Description: INTERVENTIONS:  - Assess breast/bottle feeding  - Refer to lactation as needed  Outcome: Adequate for Discharge  Goal: Incision(s), wounds(s) or drain site(s) healing without S/S of infection  Description: INTERVENTIONS  - Assess and document dressing, incision, wound bed, drain sites and surrounding tissue  - Provide patient and family education  Outcome: Adequate for Discharge     Problem: PAIN - ADULT  Goal: Verbalizes/displays adequate comfort level or baseline comfort level  Description: Interventions:  - Encourage patient to monitor pain and request assistance  - Assess pain using appropriate pain scale  - Administer analgesics based on type and severity of pain and evaluate response  - Implement non-pharmacological measures as appropriate and evaluate response  - Consider cultural and social influences on pain and pain management  - Notify physician/advanced practitioner if interventions unsuccessful or patient reports new pain  Outcome: Adequate for Discharge     Problem: INFECTION - ADULT  Goal: Absence or prevention of progression during hospitalization  Description: INTERVENTIONS:  - Assess and monitor for signs and symptoms of infection  - Monitor lab/diagnostic results  - Monitor all insertion sites, i e  indwelling lines, tubes, and drains  - Monitor endotracheal if appropriate and nasal secretions for changes in amount and color  - [place] appropriate cooling/warming therapies per order  - Administer medications as ordered  - Instruct and encourage patient and family to use good hand hygiene technique  - Identify and instruct in appropriate isolation precautions for identified infection/condition  Outcome: Adequate for Discharge  Goal: Absence of fever/infection during neutropenic period  Description: INTERVENTIONS:  - Monitor WBC    Outcome: Adequate for Discharge     Problem: SAFETY ADULT  Goal: Patient will remain free of falls  Description: INTERVENTIONS:  - Educate patient/family on patient safety including physical limitations  - Instruct patient to call for assistance with activity   - Consult OT/PT to assist with strengthening/mobility   - Keep Call bell within reach  - Keep bed low and locked with side rails adjusted as appropriate  - Keep care items and personal belongings within reach  - Initiate and maintain comfort rounds  - Make Fall Risk Sign visible to staff  - Offer Toileting every  Outcome: Adequate for Discharge  Goal: Maintain or return to baseline ADL function  Description: INTERVENTIONS:  -  Assess patient's ability to carry out ADLs; assess patient's baseline for ADL function and identify physical deficits which impact ability to perform ADLs (bathing, care of mouth/teeth, toileting, grooming, dressing, etc )  - Assess/evaluate cause of self-care deficits   - Assess range of motion  - Assess patient's mobility; develop plan if impaired  - Assess patient's need for assistive devices and provide as appropriate  - Encourage maximum independence but intervene and supervise when necessary  - Involve family in performance of ADLs  - Assess for home care needs following discharge   - Consider OT consult to assist with ADL evaluation and planning for discharge  - Provide patient education as appropriate  Outcome: Adequate for Discharge  Goal: Maintains/Returns to pre admission functional level  Description: INTERVENTIONS:  - Perform BMAT or MOVE assessment daily    - Set and communicate daily mobility goal to care team and patient/family/caregiver  - Collaborate with rehabilitation services on mobility goals if consulted  Outcome: Adequate for Discharge     Problem: Knowledge Deficit  Goal: Patient/family/caregiver demonstrates understanding of disease process, treatment plan, medications, and discharge instructions  Description: Complete learning assessment and assess knowledge base    Interventions:  - Provide teaching at level of understanding  - Provide teaching via preferred learning methods  Outcome: Adequate for Discharge     Problem: DISCHARGE PLANNING  Goal: Discharge to home or other facility with appropriate resources  Description: INTERVENTIONS:  - Identify barriers to discharge w/patient and caregiver  - Arrange for needed discharge resources and transportation as appropriate  - Identify discharge learning needs (meds, wound care, etc )  - Arrange for interpretive services to assist at discharge as needed  - Refer to Case Management Department for coordinating discharge planning if the patient needs post-hospital services based on physician/advanced practitioner order or complex needs related to functional status, cognitive ability, or social support system  Outcome: Adequate for Discharge

## 2022-07-07 ENCOUNTER — OFFICE VISIT (OUTPATIENT)
Dept: POSTPARTUM | Facility: CLINIC | Age: 47
End: 2022-07-07
Payer: COMMERCIAL

## 2022-07-07 VITALS — DIASTOLIC BLOOD PRESSURE: 80 MMHG | SYSTOLIC BLOOD PRESSURE: 120 MMHG

## 2022-07-07 DIAGNOSIS — R52 PAIN AGGRAVATED BY BREAST FEEDING: ICD-10-CM

## 2022-07-07 DIAGNOSIS — O92.79 PAIN AGGRAVATED BY BREAST FEEDING: ICD-10-CM

## 2022-07-07 DIAGNOSIS — Z71.89 ENCOUNTER FOR BREAST FEEDING COUNSELING: Primary | ICD-10-CM

## 2022-07-07 PROCEDURE — 99404 PREV MED CNSL INDIV APPRX 60: CPT | Performed by: PEDIATRICS

## 2022-07-07 NOTE — PATIENT INSTRUCTIONS
Nurse on demand: when baby gives hunger cues; when your breasts feel full, or at least every 3 hours counting from the beginning of one feeding to the beginning of the next; which ever comes first  When sucking and swallowing slow, gently compress the breast to restart flow  If active suck-swallow does not restart, gently remove the baby and offer the other breast; offering up to "four" breasts per feeding   Pay close attention to positioning for a deeper latch  Attaching Your Baby at the [employer] ([employer]) is a great resource for practicing effective positioning an determining if your baby is latching and feeding effectively  Feed expressed milk as needed/desired  Paced bottle feeding technique is less stressful for your baby, prevents overfeeding and protects the breastfeeding relationship  You can find a video about paced bottle feeding at www [employer]  org  Pump only if a feeding at the breast is missed or if Ryan does not feed effectively  When pumping, cycle your pump through stimulation and expression mode several times in a session to stimulate several let downs until you have expressed enough milk to feed the baby and to achieve breast comfort  There is no need to "empty" the breast completely  Use gentle hands on pumping and hand expression   Maintain your pump as recommended  Use flange that fits comfortably and allows the breast to empty effectively  Follow up as scheduled  Please call with any questions or concerns

## 2022-07-07 NOTE — PROGRESS NOTES
INITIAL BREAST FEEDING EVALUATION    Informant/Relationship: Russel Alvarez    Discussion of General Lactation Issues: Russel Alvarez was told by the hospital IBCLC to get supple cups to help tabitha her nipples  She is requesting to be measured for the right size  She is currently using a nipple everter to help Ryan latch  He continues to struggle to latch particularly on the left breast   He falls asleep frequently when feeding at the breast     Infant is 11days old today   History:  Fertility Problem:yes - conceived through IVF  Breast changes:yes - breasts were guillen, areola were larger and darker, breasts were tender  : yes - induced due to maternal HTN  Full term:yes - 45 5/7 weeks   labor:no  First nursing/attempt < 1 hour after birth:yes - baby latched in the delivery room  Skin to skin following delivery:yes - in the delivery room  Breast changes after delivery:yes - breasts are guillen and milk is in  Rooming in (infant in room with mother with exception of procedures, eg  Circumcision: yes  Blood sugar issues:no  NICU stay:no  Jaundice:no  Phototherapy:no  Supplement given: (list supplement and method used as well as reason(s):yes - expressed colostrum via syringe due to latch issues    Past Medical History:   Diagnosis Date    Abnormal Pap smear of cervix     in 30's       Anxiety     Environmental allergies     GERD (gastroesophageal reflux disease)     History of infertility     Hypertension     PONV (postoperative nausea and vomiting)     Sinus infection     chronic    Subclinical hypothyroidism          Current Outpatient Medications:     acetaminophen (TYLENOL) 325 mg tablet, Take 2 tablets (650 mg total) by mouth every 6 (six) hours as needed for mild pain, headaches or moderate pain, Disp: , Rfl: 0    Ascorbic Acid (VITAMIN C PO), Take by mouth, Disp: , Rfl:     calcium carbonate (OS-ROSA) 600 MG tablet, Take 600 mg by mouth daily, Disp: , Rfl:     Cetirizine HCl (ZYRTEC ALLERGY PO), Take by mouth, Disp: , Rfl:     docusate sodium (COLACE) 100 mg capsule, Take 100 mg by mouth 2 (two) times a day, Disp: , Rfl:     ibuprofen (MOTRIN) 600 mg tablet, Take 1 tablet (600 mg total) by mouth every 6 (six) hours as needed for mild pain or moderate pain, Disp: 30 tablet, Rfl: 0    labetalol (NORMODYNE) 200 mg tablet, Take 1 tablet (200 mg total) by mouth every 12 (twelve) hours, Disp: 60 tablet, Rfl: 2    levothyroxine 25 mcg tablet, TAKE 1 TABLET BY MOUTH EVERY DAY, Disp: 30 tablet, Rfl: 2    melatonin 3 mg, Take 4 mg by mouth daily at bedtime, Disp: , Rfl:     Prenatal MV-Min-Fe Fum-FA-DHA (PRENATAL 1 PO), Take by mouth, Disp: , Rfl:     Pyridoxine HCl (VITAMIN B-6) 500 MG tablet, Take 500 mg by mouth daily, Disp: , Rfl:     VITAMIN D PO, Take by mouth, Disp: , Rfl:     Allergies   Allergen Reactions    Other      Seasonal         Social History     Substance and Sexual Activity   Drug Use No       Social History Never a smoker    Interval Breastfeeding History:    Frequency of breast feeding: On demand every 2-4 hours  Does mother feel breastfeeding is effective: No  Does infant appear satisfied after nursing:Not typically  Stooling pattern normal: Yes  Urinating frequently:Yes  Using shield or shells: No    Alternative/Artificial Feedings:   Bottle: Yes, to supplement after nursing if he is not content  Cup: No  Syringe/Finger: No           Formula Type: none                     Amount: n/a            Breast Milk:                      Amount: 20-60ml            Frequency Q 2-4 Hr between feedings  Elimination Problems: No      Equipment:  Nipple Shield             Type: none             Size: n/a             Frequency of Use: n/a  Pump            Type: Spectra S1            Frequency of Use: Before nursing and after every feeding    Expresses about 80ml per session  Shells            Type: none            Frequency of use: n/a    Equipment Problems: no    Mom:  Breast: Large symmetrical breasts  Rounded shape  Closely spaced  Nipple Assessment in General: Everted nipples bilaterally  Healing scabs on the face of the left nipple  Mother's Awareness of Feeding Cues                 Recognizes: Yes                  Verbalizes: Yes  Support System: FOB, extended family  History of Breastfeeding: none  Changes/Stressors/Violence: Jorge Petit is concerned that Monet Hull is not latching or nursing effectively  Concerns/Goals: Jorge Petit desires to exclusively breastmilk feed    Problems with Mom: none    Physical Exam  Constitutional:       Appearance: Normal appearance  HENT:      Head: Normocephalic and atraumatic  Cardiovascular:      Rate and Rhythm: Normal rate and regular rhythm  Pulses: Normal pulses  Heart sounds: Normal heart sounds  Pulmonary:      Effort: Pulmonary effort is normal       Breath sounds: Normal breath sounds  Musculoskeletal:         General: Normal range of motion  Cervical back: Normal range of motion and neck supple  Right lower leg: Edema present  Left lower leg: Edema present  Neurological:      Mental Status: She is alert and oriented to person, place, and time  Skin:     General: Skin is warm and dry  Psychiatric:         Mood and Affect: Mood normal          Behavior: Behavior normal          Thought Content: Thought content normal          Judgment: Judgment normal          Infant:  Behaviors: Alert  Color: Pink  Birth weight: 3345gram  Current weight: 3120gram    Problems with infant: not latching or nursing effectively      General Appearance:  Alert, active, no distress                             Head:  Normocephalic, AFOF, sutures opposed                             Eyes:  Conjunctiva clear, no drainage                              Ears:  Normally placed, no anomolies                             Nose:  no drainage or erythema                           Mouth:  No lesions    Tongue extends just barely to the lower lip, lateralizes well but the body of the tongue remains flat when crying  There was good cupping of my finger and effective suckling when I applied pressure to the tongue with my finger  When I released the pressure, he just compressed my finger with his tongue  There is a speed bump felt when sweeping my finger under the tongue                    Neck:  Supple, symmetrical, trachea midline                 Respiratory:  No grunting, flaring, retractions, breath sounds clear and equal            Cardiovascular:  Regular rate and rhythm  No murmur  Adequate perfusion/capillary refill  Femoral pulse present                    Abdomen:   Soft, non-tender, no masses, bowel sounds present, no HSM             Genitourinary:  Normal male, testes descended, no discharge, swelling, or pain, anus patent                          Spine:   No abnormalities noted        Musculoskeletal:  Full range of motion          Skin/Hair/Nails:   Skin warm, dry, and intact, no rashes, jaundice to umbilicus                Neurologic:   No abnormal movement, tone appropriate for gestational age     Latch:  Efficiency:               Lips Flanged: Yes              Depth of latch: deep              Audible Swallow: Yes, several suckling bursts noted              Visible Milk: Yes              Wide Open/ Asymmetrical: Yes              Suck Swallow Cycle: Breathing: unlabored, Coordinated: yes  Nipple Assessment after latch: Normal: elongated/eraser, no discoloration and no damage noted  Latch Problems: Aurora Griffith needed a little support with positioning  After making a few small adjustments, Ryan latched and fed effectively and Aurora Griffith was completely comfortable  Ryan fed on both breasts and was completely content after feeding  Position:  Infant's Ergonomics/Body               Body Alignment: Yes               Head Supported: Yes               Close to Mom's body/ Lifted/ Supported:  Yes               Mom's Ergonomics/Body: Yes, after education                           Supported: Yes                           Sitting Back: Yes                           Brings Baby to her breast: Yes, after education  Positioning Problems: Nacho Rolle did well  We just needed to adjust her position so her spine was in neutral position rather than twisted and position Ryan with her nipple just below his nose rather than at his mouth  We also adjusted her hand position slightly on her breast to compress the breast in alignment with his mouth  Handouts:   Paced bottle feeding, Hands on pumping, Hand expression and Latch Check List    Education:  Reviewed Latch: Demonstrated how to gently compress the breast and align the baby so that his nose is just above the nipple with his lower lip and chin touching the breast to encourage the deepest, widest, off-center latch  Reviewed Positioning for Dyad: Demonstrated how to position mom comfortably and supported prior to bringing her baby to her breast  Reviewed Frequency/Supply & Demand: Discussed how milk production is established and maintained  Reviewed Infant:Cues and varied States of Awareness  Reviewed Alternative/Artificial Feedings: Discussed and demonstrated paced bottle feeding  Reviewed Mom/Breast care: Discussed moist wound care for Jocelyn's sore nipples  Reviewed Equipment: Discussed the use and features of the Spectra S1 and the elements of hands on pumping  Plan:   Reassurance and support given  I encouraged Nacho Rolle to continue to feed Ryan at least every 3 hours until he exceeds his birth weight  We worked on positioning to improve her comfort and confidence and Ryan's ability to latch effectively  She and Low Monge were taught paced bottle feeding technique for when expressed milk is fed  I recommended that Nacho Rolle pump only if a feeding at the breast is missed  She was given instructions for gentle, effective pumping     I suggested moist wound care for her sore nipples  A follow up appointment was scheduled at her request   I encouraged her to call with any questions or concerns  I have spent 90 minutes with Patient and family today in which greater than 50% of this time was spent in counseling/coordination of care regarding Patient and family education

## 2022-07-08 LAB — PLACENTA IN STORAGE: NORMAL

## 2022-07-09 NOTE — PROGRESS NOTES
I have reviewed the notes, assessments, and/or procedures performed by Ena Mcmahon RN, IBCLC, I concur with her/his documentation of Kenrick Brannon MD 07/09/22

## 2022-07-20 ENCOUNTER — OFFICE VISIT (OUTPATIENT)
Dept: POSTPARTUM | Facility: CLINIC | Age: 47
End: 2022-07-20
Payer: COMMERCIAL

## 2022-07-20 VITALS — DIASTOLIC BLOOD PRESSURE: 90 MMHG | SYSTOLIC BLOOD PRESSURE: 130 MMHG

## 2022-07-20 DIAGNOSIS — R52 PAIN AGGRAVATED BY BREAST FEEDING: ICD-10-CM

## 2022-07-20 DIAGNOSIS — O92.79 PAIN AGGRAVATED BY BREAST FEEDING: ICD-10-CM

## 2022-07-20 DIAGNOSIS — Z71.89 ENCOUNTER FOR BREAST FEEDING COUNSELING: Primary | ICD-10-CM

## 2022-07-20 PROCEDURE — 99404 PREV MED CNSL INDIV APPRX 60: CPT | Performed by: PEDIATRICS

## 2022-07-20 NOTE — PROGRESS NOTES
BREAST FEEDING FOLLOW UP VISIT    Informant/Relationship: Caro Armstrong    Discussion of General Lactation Issues: Caro Armstrong feels things are better  She is still having some pain when feeding on the right breast now  She is also asking for help feeding without the help of a breastfeeding pillow  Infant is 3weeks old today  Interval Breastfeeding History:    Frequency of breast feeding: On demand every 2-3 5 hours  Does mother feel breastfeeding is effective: Yes  Does infant appear satisfied after nursing:Yes  Stooling pattern normal:Yes  Urinating frequently:Yes  Using shield or shells:No    Alternative/Artificial Feedings:   Bottle: Yes, when Caro Armstrong is not home and occasionally for other reasons  Cup: No  Syringe/Finger: No           Formula Type: none                     Amount: n/a            Breast Milk:                      Amount: 80ml            Frequency Q 2-3 5 Hr between feedings  Elimination Problems: No      Equipment:  Nipple Shield             Type: none             Size: n/a             Frequency of Use: n/a  Pump            Type: Spectra S1            Frequency of Use: as needed to obtain milk for bottles or for comfort  Shells            Type: none            Frequency of use: n/a    Equipment Problems: no    Mom:  Breast: Large symmetrical breasts  Rounded shape  Closely spaced  Nipple Assessment in General: Everted nipples bilaterally  Right nipple tender at the base at about 5-7 o'clock  Mother's Awareness of Feeding Cues                 Recognizes: Yes                  Verbalizes: Yes  Support System: FOB, extended family  History of Breastfeeding: none  Changes/Stressors/Violence: Caro Armstrong is concerned that feedings have become painful on the right side  Concerns/Goals: Caro Armstrong desires to exclusively breastmilk feed     Problems with Mom: sore right nipple      Physical Exam  Constitutional:       Appearance: Normal appearance  HENT:      Head: Normocephalic and atraumatic  Pulmonary:      Effort: Pulmonary effort is normal    Musculoskeletal:         General: Normal range of motion  Cervical back: Normal range of motion and neck supple  Neurological:      Mental Status: She is alert and oriented to person, place, and time  Skin:     General: Skin is warm and dry  Psychiatric:         Mood and Affect: Mood normal          Behavior: Behavior normal          Thought Content: Thought content normal          Judgment: Judgment normal          Infant:  Behaviors: Alert  Color: Pink  Birth weight: 3345gram  Current weight: 3650gram    Problems with infant: none    General Appearance:  Alert, active, no distress                             Head:  Normocephalic, AFOF, sutures opposed                             Eyes:  Conjunctiva clear, purulent drainage OU                              Ears:  Normally placed, no anomolies                             Nose:  no drainage or erythema                           Mouth:  No lesions  Tongue extends just barely to the lower lip, lateralizes well but the body of the tongue remains flat when crying  There was good cupping of my finger and effective suckling when I applied pressure to the tongue with my finger  When I released the pressure, he just compressed my finger with his tongue  There is a speed bump felt when sweeping my finger under the tongue                             Neck:  Supple, symmetrical, trachea midline                 Respiratory:  No grunting, flaring, retractions, breath sounds clear and equal            Cardiovascular:  Regular rate and rhythm  No murmur  Adequate perfusion/capillary refill   Femoral pulse present                    Abdomen:   Soft, non-tender, no masses, bowel sounds present, no HSM             Genitourinary:  Normal male, testes descended, no discharge, swelling, or pain, anus patent                          Spine:   No abnormalities noted        Musculoskeletal:  Full range of motion Skin/Hair/Nails:   Skin warm, dry, and intact, no rashes, jaundice to umbilicus                Neurologic:   No abnormal movement, tone appropriate for gestational age        Latch:  Efficiency:               Lips Flanged: Yes              Depth of latch: deep              Audible Swallow: Yes, frequently              Visible Milk: Yes              Wide Open/ Asymmetrical: Yes              Suck Swallow Cycle: Breathing: unlabored, Coordinated: yes  Nipple Assessment after latch: Normal: elongated/eraser, no discoloration and no damage noted  Latch Problems: None  With good positioning, Ryan was able to latch effectively and other than brief latch on pain, Mele De Leon was completely comfortable    Position:  Infant's Ergonomics/Body               Body Alignment: Yes, after review               Head Supported: Yes               Close to Mom's body/ Lifted/ Supported: Yes, after education               Mom's Ergonomics/Body: Yes                           Supported: Yes                           Sitting Back: Yes                           Brings Baby to her breast: Yes  Positioning Problems: Initially, Ryan was positioned above the nipple and his body was rotated away from the breast      Handouts:   None    Education:  Reviewed Positioning for Dyad: Demonstrated how to position baby belly to belly with mom in cradle hold without the use of a pillow        Plan:   Reassurance and support given  I encouraged Mele De Leon to continue to [employer] on demand  We made a small adjustment to positioning which improved her comfort  I encouraged her to come back if her nipple pain does not resolve quickly or with any other concerns  I have spent 60 minutes with Patient and family today in which greater than 50% of this time was spent in counseling/coordination of care regarding Patient and family education

## 2022-07-20 NOTE — PATIENT INSTRUCTIONS
Continue with your current feeding plan  Please call if your nipple pain does not resolve in the next few days  Please call with any questions or concerns

## 2022-07-27 ENCOUNTER — TELEPHONE (OUTPATIENT)
Dept: GASTROENTEROLOGY | Facility: CLINIC | Age: 47
End: 2022-07-27

## 2022-07-27 NOTE — TELEPHONE ENCOUNTER
Please let patient that I spoke with Radiology and they are recommending pumping 1-2 times after scanned and then dumping the milk  She would obviously need an os reserved to make up for those to feedings  A very low percentage of contrast is excreted into the breast milk and absorbed by the baby  She can let us know how she wants to proceed  Thank you

## 2022-07-27 NOTE — TELEPHONE ENCOUNTER
Spoke with patient, she does not feel comfortable with pumping her milk and dumping it so soon into breast feeding  She explains she does not want to supplement with formula and does not have enough in storage to feed her baby  She would like to postpone MRI for another month is that is okay?

## 2022-07-27 NOTE — TELEPHONE ENCOUNTER
Patients GI provider:  Dr Terrell Mcmillan    Number to return call: ( [phone])    Reason for call: Pt calling because she is scheduled 8/9 for a MRI with and without contrast  She is post partum 3 weeks and is exclusively breast feeding  She is concerned about the contrast with her breast feeding  Please call  Thank you!     Scheduled procedure/appointment date if applicable: Apt/procedure 9/29/22

## 2022-07-31 NOTE — PROGRESS NOTES
I have reviewed the notes, assessments, and/or procedures performed by Mike Kovacs RN, IBCLC, I concur with her/his documentation of Ortiz Nunes MD 07/31/22

## 2022-08-02 ENCOUNTER — TELEPHONE (OUTPATIENT)
Dept: POSTPARTUM | Facility: CLINIC | Age: 47
End: 2022-08-02

## 2022-08-02 NOTE — TELEPHONE ENCOUNTER
Spoke with Michelle Olmedo - she is having an MRI w/contrast & one without in September & is asking what to do about her breast milk after the contrast     Ryan 4wks

## 2022-08-02 NOTE — TELEPHONE ENCOUNTER
Radiologic Studies During Breastfeeding   CTs and MRIs   Nursing mothers occasionally need to under radiologic procedures such as CT scans, MRIs, angiography, or urograms  These scans typically use intravenous and/or oral contrast, either iodinated contrast or gadolinium-containing contrast    The [employer] of Radiology has determined that less than 1% of these agents are excreted into breastmilk, and less than 1% of any contrast medium in breastmilk would be absorbed thru the infant's GI tract  Therefore, breastfeeding does not need to be interrupted when a mother undergoes an MRI, CT, angiogram or urogram when these contrast agents are used

## 2022-08-17 ENCOUNTER — POSTPARTUM VISIT (OUTPATIENT)
Dept: OBGYN CLINIC | Facility: CLINIC | Age: 47
End: 2022-08-17

## 2022-08-17 VITALS
WEIGHT: 192 LBS | HEIGHT: 64 IN | DIASTOLIC BLOOD PRESSURE: 78 MMHG | BODY MASS INDEX: 32.78 KG/M2 | SYSTOLIC BLOOD PRESSURE: 120 MMHG

## 2022-08-17 PROBLEM — Z34.03 ENCOUNTER FOR SUPERVISION OF NORMAL FIRST PREGNANCY IN THIRD TRIMESTER: Status: RESOLVED | Noted: 2021-12-14 | Resolved: 2022-08-17

## 2022-08-17 PROBLEM — Z3A.38 38 WEEKS GESTATION OF PREGNANCY: Status: RESOLVED | Noted: 2021-12-30 | Resolved: 2022-08-17

## 2022-08-17 PROBLEM — O46.8X1 SUBCHORIONIC HEMATOMA IN FIRST TRIMESTER: Status: RESOLVED | Noted: 2021-12-30 | Resolved: 2022-08-17

## 2022-08-17 PROBLEM — U07.1 COVID-19 AFFECTING PREGNANCY IN FIRST TRIMESTER: Status: RESOLVED | Noted: 2021-12-30 | Resolved: 2022-08-17

## 2022-08-17 PROBLEM — O09.513 AMA (ADVANCED MATERNAL AGE) PRIMIGRAVIDA 35+, THIRD TRIMESTER: Status: RESOLVED | Noted: 2022-01-04 | Resolved: 2022-08-17

## 2022-08-17 PROBLEM — O41.8X10 SUBCHORIONIC HEMATOMA IN FIRST TRIMESTER: Status: RESOLVED | Noted: 2021-12-30 | Resolved: 2022-08-17

## 2022-08-17 PROBLEM — O10.919 CHRONIC HYPERTENSION AFFECTING PREGNANCY: Status: RESOLVED | Noted: 2021-08-27 | Resolved: 2022-08-17

## 2022-08-17 PROBLEM — O98.511 COVID-19 AFFECTING PREGNANCY IN FIRST TRIMESTER: Status: RESOLVED | Noted: 2021-12-30 | Resolved: 2022-08-17

## 2022-08-17 LAB
BACTERIA UR QL AUTO: NORMAL /HPF
BILIRUB UR QL STRIP: NEGATIVE
CLARITY UR: CLEAR
COLOR UR: NORMAL
GLUCOSE UR STRIP-MCNC: NEGATIVE MG/DL
HGB UR QL STRIP.AUTO: NEGATIVE
KETONES UR STRIP-MCNC: NEGATIVE MG/DL
LEUKOCYTE ESTERASE UR QL STRIP: NEGATIVE
NITRITE UR QL STRIP: NEGATIVE
NON-SQ EPI CELLS URNS QL MICRO: NORMAL /HPF
PH UR STRIP.AUTO: 6 [PH]
PROT UR STRIP-MCNC: NEGATIVE MG/DL
RBC #/AREA URNS AUTO: NORMAL /HPF
SL AMB  POCT GLUCOSE, UA: NORMAL
SL AMB LEUKOCYTE ESTERASE,UA: NORMAL
SL AMB POCT BILIRUBIN,UA: NORMAL
SL AMB POCT BLOOD,UA: NORMAL
SL AMB POCT KETONES,UA: NORMAL
SL AMB POCT NITRITE,UA: NORMAL
SL AMB POCT PH,UA: NORMAL
SL AMB POCT SPECIFIC GRAVITY,UA: 1.01
SL AMB POCT URINE PROTEIN: NORMAL
SL AMB POCT UROBILINOGEN: 0.2
SP GR UR STRIP.AUTO: 1.01 (ref 1–1.03)
UROBILINOGEN UR STRIP-ACNC: <2 MG/DL
WBC #/AREA URNS AUTO: NORMAL /HPF

## 2022-08-17 PROCEDURE — 81001 URINALYSIS AUTO W/SCOPE: CPT | Performed by: STUDENT IN AN ORGANIZED HEALTH CARE EDUCATION/TRAINING PROGRAM

## 2022-08-17 PROCEDURE — 87086 URINE CULTURE/COLONY COUNT: CPT | Performed by: STUDENT IN AN ORGANIZED HEALTH CARE EDUCATION/TRAINING PROGRAM

## 2022-08-17 PROCEDURE — 99024 POSTOP FOLLOW-UP VISIT: CPT | Performed by: STUDENT IN AN ORGANIZED HEALTH CARE EDUCATION/TRAINING PROGRAM

## 2022-08-17 NOTE — PROGRESS NOTES
[facility]  Postpartum Visit  Antony Cleveland MD  22      Assessment:  Gabriel Ewing is a 41 y o  who is 6 weeks postpartum   from an  with a normal postpartum examination  Halsey score: 1    Plan:  1  Contraception: Declines- reviewed recommendations of pregnancy spacing to reduce PTL and SGA  2  Annual exam due in December; Last Pap: 2021, Due on 2026  3  Urinary complaints- UA/ UCx sent  4  Chronic Hypertension: continue on labetalol 200mg PO BID  5  Lactation consult, [address] [facility] information discussed- for increased anxiety  6  Increase activity as tolerated, may resume all normal activity  Subjective     Gabriel Ewing is a 41 y o  female who presents for a postpartum visit  She is 6 weeks postpartum following a spontaneous vaginal delivery  I have fully reviewed the prenatal and intrapartum course  The delivery was at 45 gestational weeks  She delivered a female/ male , weight 7 lb 6 oz with APGARS of 8 and 8 at 1 and 5 minutes respectively  Anesthesia: epidural  Laceration: 1st degree  Lochia is no bleeding  Bowel function is normal  Bladder function is abnormal: reporting some hesitancy- deneis any dysuria  Desired contraception method is none- IUI pregnancy  She is aware she can get pregnant even though she completed IUI last time  Postpartum depression screening: negative  Does endorse some anxiety related to breast feeding and baby not yet on a schedule  Declines medications  Reviewed therapist available at [facility] and [facility]  Baby's course has been uncomplicated     Baby is feeding by breast     Last Pap : 2021- NILM, HPV neg  Gestational Diabetes: no  Pregnancy Complications: chronic HTN, AMA    The following portions of the patient's history were reviewed and updated as appropriate: allergies, current medications, past family history, past medical history, past social history, past surgical history and problem list       Current Outpatient Medications:     Ascorbic Acid (VITAMIN C PO), Take by mouth, Disp: , Rfl:     calcium carbonate (OS-ROSA) 600 MG tablet, Take 600 mg by mouth daily, Disp: , Rfl:     Cetirizine HCl (ZYRTEC ALLERGY PO), Take by mouth, Disp: , Rfl:     docusate sodium (COLACE) 100 mg capsule, Take 100 mg by mouth 2 (two) times a day, Disp: , Rfl:     labetalol (NORMODYNE) 200 mg tablet, TAKE 1 TABLET BY MOUTH EVERY 12 HOURS , Disp: 60 tablet, Rfl: 1    levothyroxine 25 mcg tablet, TAKE 1 TABLET BY MOUTH EVERY DAY, Disp: 30 tablet, Rfl: 2    Prenatal MV-Min-Fe Fum-FA-DHA (PRENATAL 1 PO), Take by mouth, Disp: , Rfl:     VITAMIN D PO, Take by mouth, Disp: , Rfl:     acetaminophen (TYLENOL) 325 mg tablet, Take 2 tablets (650 mg total) by mouth every 6 (six) hours as needed for mild pain, headaches or moderate pain (Patient not taking: No sig reported), Disp: , Rfl: 0    ibuprofen (MOTRIN) 600 mg tablet, Take 1 tablet (600 mg total) by mouth every 6 (six) hours as needed for mild pain or moderate pain (Patient not taking: No sig reported), Disp: 30 tablet, Rfl: 0    Pyridoxine HCl (VITAMIN B-6) 500 MG tablet, Take 500 mg by mouth daily (Patient not taking: Reported on 8/17/2022), Disp: , Rfl:     Allergies   Allergen Reactions    Other      Seasonal         Review of Systems  Constitutional: no fever, feels well  Breasts: no complaints of breast pain, breast lump, or nipple discharge  Gastrointestinal: no complaints nausea, vomiting  Genitourinary: as noted in HPI  Neurological: no complaints of headache    Objective      /78 (BP Location: Left arm, Patient Position: Standing, Cuff Size: Standard)   Ht 5' 4" (1 626 m)   Wt 87 1 kg (192 lb)   LMP 10/07/2021   Breastfeeding Yes   BMI 32 96 kg/m²   Physical Exam  Vitals and nursing note reviewed  Constitutional:       General: She is not in acute distress  Appearance: She is well-developed   She is not ill-appearing or diaphoretic  HENT:      Head: Normocephalic and atraumatic  Eyes:      Conjunctiva/sclera: Conjunctivae normal    Cardiovascular:      Rate and Rhythm: Normal rate  Pulmonary:      Effort: Pulmonary effort is normal  No respiratory distress  Abdominal:      Palpations: Abdomen is soft  Tenderness: There is no abdominal tenderness  Genitourinary:     General: Normal vulva  Comments: On speculum exam the cervix is closed there was minimal thin mucoid discharge, no blood or lesions identified  Musculoskeletal:      Right lower leg: No edema  Left lower leg: No edema  Skin:     General: Skin is warm and dry  Neurological:      Mental Status: She is alert and oriented to person, place, and time     Psychiatric:         Mood and Affect: Mood normal          Behavior: Behavior normal

## 2022-08-19 LAB — BACTERIA UR CULT: NORMAL

## 2022-08-21 DIAGNOSIS — E03.8 SUBCLINICAL HYPOTHYROIDISM: ICD-10-CM

## 2022-08-22 RX ORDER — LEVOTHYROXINE SODIUM 0.03 MG/1
TABLET ORAL
Qty: 30 TABLET | Refills: 2 | Status: SHIPPED | OUTPATIENT
Start: 2022-08-22

## 2022-09-16 ENCOUNTER — HOSPITAL ENCOUNTER (OUTPATIENT)
Dept: MRI IMAGING | Facility: HOSPITAL | Age: 47
Discharge: HOME | End: 2022-09-16
Payer: COMMERCIAL

## 2022-09-16 DIAGNOSIS — K76.9 HEPATIC LESION: ICD-10-CM

## 2022-09-16 PROCEDURE — 74183 MRI ABD W/O CNTR FLWD CNTR: CPT

## 2022-09-16 PROCEDURE — G1004 CDSM NDSC: HCPCS

## 2022-09-16 PROCEDURE — A9585 GADOBUTROL INJECTION: HCPCS | Performed by: NURSE PRACTITIONER

## 2022-09-16 RX ADMIN — GADOBUTROL 9 ML: 604.72 INJECTION INTRAVENOUS at 14:33

## 2022-09-28 ENCOUNTER — TELEPHONE (OUTPATIENT)
Dept: POSTPARTUM | Facility: CLINIC | Age: 47
End: 2022-09-28

## 2022-09-28 NOTE — TELEPHONE ENCOUNTER
Iain Mendoza called - she has a mammogram scheduled for in October & she is asking if she needs to leigh this till after she is finished nursing Ryan (2m)

## 2022-09-28 NOTE — TELEPHONE ENCOUNTER
I reviewed with Michelle Olmedo that there is no need to delay her mammogram   I recommended that she feed or pump just prior to her exam for her comfort

## 2022-09-29 ENCOUNTER — OFFICE VISIT (OUTPATIENT)
Dept: GASTROENTEROLOGY | Facility: AMBULARY SURGERY CENTER | Age: 47
End: 2022-09-29
Payer: COMMERCIAL

## 2022-09-29 ENCOUNTER — TELEPHONE (OUTPATIENT)
Dept: GASTROENTEROLOGY | Facility: AMBULARY SURGERY CENTER | Age: 47
End: 2022-09-29

## 2022-09-29 VITALS
WEIGHT: 192 LBS | BODY MASS INDEX: 32.78 KG/M2 | RESPIRATION RATE: 18 BRPM | HEART RATE: 74 BPM | OXYGEN SATURATION: 100 % | HEIGHT: 64 IN | SYSTOLIC BLOOD PRESSURE: 120 MMHG | DIASTOLIC BLOOD PRESSURE: 72 MMHG

## 2022-09-29 DIAGNOSIS — K82.4 GALLBLADDER POLYP: ICD-10-CM

## 2022-09-29 DIAGNOSIS — D18.03 HEMANGIOMA OF INTRA-ABDOMINAL STRUCTURE: ICD-10-CM

## 2022-09-29 DIAGNOSIS — R74.8 ELEVATED ALKALINE PHOSPHATASE LEVEL: ICD-10-CM

## 2022-09-29 DIAGNOSIS — Z12.11 SCREENING FOR COLON CANCER: ICD-10-CM

## 2022-09-29 DIAGNOSIS — K21.9 GASTROESOPHAGEAL REFLUX DISEASE, UNSPECIFIED WHETHER ESOPHAGITIS PRESENT: Primary | ICD-10-CM

## 2022-09-29 PROCEDURE — 99214 OFFICE O/P EST MOD 30 MIN: CPT | Performed by: PHYSICIAN ASSISTANT

## 2022-09-29 RX ORDER — SODIUM PICOSULFATE, MAGNESIUM OXIDE, AND ANHYDROUS CITRIC ACID 10; 3.5; 12 MG/160ML; G/160ML; G/160ML
LIQUID ORAL
Qty: 320 ML | Refills: 0 | Status: SHIPPED | OUTPATIENT
Start: 2022-09-29

## 2022-09-29 RX ORDER — FAMOTIDINE 20 MG/1
TABLET, FILM COATED ORAL
COMMUNITY
Start: 2022-01-20

## 2022-09-29 RX ORDER — VALACYCLOVIR HYDROCHLORIDE 500 MG/1
500 TABLET, FILM COATED ORAL DAILY
COMMUNITY
Start: 2022-09-22

## 2022-09-29 NOTE — ASSESSMENT & PLAN NOTE
Benign appearing gallbladder polyps seen on ultrasound from June     -follow-up on ultrasound to follow-up in December

## 2022-09-29 NOTE — ASSESSMENT & PLAN NOTE
Asymptomatic hemangioma in segment 5 measuring only 1 cm on recent MRI; should not require further surveillance at this time    - reviewed MRI findings with patient and reassured her, discussed about pathology and management of liver hemangiomas

## 2022-09-29 NOTE — ASSESSMENT & PLAN NOTE
Alkaline phosphatase level noted in the 170s July 1st, the day before she delivered with her recent pregnancy; nonspecific and may be related to pregnancy associated cholestasis    - will check liver function tests with other lab work (CBC) prior to EGD/colonoscopy, can consider further workup if alkaline phosphatase remains elevated but suspect this has normalized

## 2022-09-29 NOTE — ASSESSMENT & PLAN NOTE
Has history of GERD symptomatology which was exacerbated during pregnancy, still using Pepcid daily but with some improvement in symptoms    Reasonable to rule out underlying Barretts, she is already being planned for colonoscopy     -plan for EGD at the same time as colonoscopy     -procedure was explained in detail to the patient at this time, including associated risks and benefits     -continue Pepcid daily for now    -advised patient regarding dietary and lifestyle modification strategies for the mitigation of GERD

## 2022-09-29 NOTE — ASSESSMENT & PLAN NOTE
Patient is 41years old and has never had colonoscopy; no elevated risk factors, no alarm symptoms at this time, rule out adenomatous polyps or malignancy     -plan for colonoscopy at the same time as EGD; again procedures were explained in detail to the patient at this time     -prescription and instructions provided for colonoscopy prep

## 2022-09-29 NOTE — PROGRESS NOTES
Follow-up Note -  [facility]  Katarzyna Cagle [facility] 1975 41 y o  female         Reason:  Follow-up; GERD, colon cancer screening, liver lesion    HPI:  61-year-old female with history of hypothyroidism, anxiety who presents for follow-up; she was seen in our office with Dr Jonnathan Martell at the end of May, at that time 34 weeks pregnant, for evaluation of focal tenderness in the epigastric region which was noted constant and without any apparent relationship to food intake  She also been endorsing GERD symptomatology so was recommended to add a 2nd evening time dose of Pepcid to her acid reducing regimen  She also had RUQ ultrasound in March showing no gallbladder stones  At this time, patient says she is feeling a lot better in thinks a lot of her abdominal discomfort and reflux symptoms were pregnancy related, as she noted a lot of improvement after delivery  She says she still takes Pepcid daily in the morning which is helpful, she says she does periodically still get acid reflux symptomatology, and did historically have some issues with reflux prior to her pregnancy, has never had EGD  She has never had colonoscopy either, denies any family history of colon cancer, she is now 41years old  She says she moves her bowels about once every 1-2 days, she denies any vomiting or nausea, any dysphagia, any unintentional weight loss  She also states she is here to review MRI results, she was previously seen with a indeterminate liver lesion on ultrasound, this was followed up with MRIs 09/16/2022, confirming 1 cm hemangioma in segment 5, she had also been noted with gallbladder polyps on a previous ultrasound was recommended for surveillance ultrasound in 6 months; she still has appointment for December, as the MRI did not visualize the gallbladder polyps well    Her last lab work on July 1st (the day before she delivered), showed elevated alkaline phosphatase in the 170s with the rest of her liver enzymes appearing normal     REVIEW OF SYSTEMS:      CONSTITUTIONAL: Denies any fever, chills, or rigors  Good appetite, and no recent weight loss  HEENT: No earache or tinnitus  Denies hearing loss or visual disturbances  CARDIOVASCULAR: No chest pain or palpitations  RESPIRATORY: Denies any cough, hemoptysis, shortness of breath or dyspnea on exertion  GASTROINTESTINAL: As noted in the History of Present Illness  GENITOURINARY: No problems with urination  Denies any hematuria or dysuria  NEUROLOGIC: No dizziness or vertigo, denies headaches  MUSCULOSKELETAL: Denies any muscle or joint pain  SKIN: Denies skin rashes or itching  ENDOCRINE: Denies excessive thirst  Denies intolerance to heat or cold  PSYCHOSOCIAL: Denies depression or anxiety  Denies any recent memory loss  Past Medical History:   Diagnosis Date    Abnormal Pap smear of cervix     in 30's   Anxiety     Environmental allergies     Female infertility 2018    Genital warts 2005    Has not returned    GERD (gastroesophageal reflux disease)     History of infertility     Hypertension     PONV (postoperative nausea and vomiting)     Sinus infection     chronic    Subclinical hypothyroidism     Varicella 1985      Past Surgical History:   Procedure Laterality Date    CONDYLOMA EXCISION/FULGURATION  2005    HAND SURGERY Left     ganglion cyst    IL LARYNGOSCOPY,DIRECT,SCOPE,INJ CORDS N/A 04/10/2017    Procedure: MICROLARYNGOSCOPY BILATERAL VOCAL FOLD INJECTION (DECADRON);   Surgeon: Mele Ferguson MD;  [facility];  Service: ENT    REFRACTIVE SURGERY  2010    WISDOM TOOTH EXTRACTION       Social History     Socioeconomic History    Marital status: /Civil Union     Spouse name: Not on file    Number of children: Not on file    Years of education: Not on file    Highest education level: Not on file   Occupational History    Not on file   Tobacco Use    Smoking status: Never Smoker    Smokeless tobacco: Never Used   Vaping Use    Vaping Use: Never used   Substance and Sexual Activity    Alcohol use: Not Currently     Comment: socially 1-2 drinks/wk    Drug use: No    Sexual activity: Yes     Partners: Male     Birth control/protection: None   Other Topics Concern    Not on file   Social History Narrative    Not on file     Social Determinants of Health     Financial Resource Strain: Not on file   Food Insecurity: Not on file   Transportation Needs: Not on file   Physical Activity: Not on file   Stress: Not on file   Social Connections: Not on file   Intimate Partner Violence: Not on file   Housing Stability: Not on file     Family History   Problem Relation Age of Onset    Breast cancer Mother 48    Hypertension Father     Diabetes type I Brother     Diabetes Maternal Grandmother     Diabetes Paternal Grandfather     Diabetes Brother      Other  Current Outpatient Medications   Medication Sig Dispense Refill    Ascorbic Acid (VITAMIN C PO) Take by mouth      calcium carbonate (OS-ROSA) 600 MG tablet Take 600 mg by mouth daily      Cetirizine HCl (ZYRTEC ALLERGY PO) Take by mouth      docusate sodium (COLACE) 100 mg capsule Take 100 mg by mouth 2 (two) times a day      famotidine (PEPCID) 20 mg tablet       labetalol (NORMODYNE) 200 mg tablet TAKE 1 TABLET BY MOUTH EVERY 12 HOURS   60 tablet 1    levothyroxine 25 mcg tablet TAKE 1 TABLET BY MOUTH EVERY DAY 30 tablet 2    Prenatal MV-Min-Fe Fum-FA-DHA (PRENATAL 1 PO) Take by mouth      valACYclovir (VALTREX) 500 mg tablet Take 500 mg by mouth daily      VITAMIN D PO Take by mouth      acetaminophen (TYLENOL) 325 mg tablet Take 2 tablets (650 mg total) by mouth every 6 (six) hours as needed for mild pain, headaches or moderate pain (Patient not taking: No sig reported)  0    ibuprofen (MOTRIN) 600 mg tablet Take 1 tablet (600 mg total) by mouth every 6 (six) hours as needed for mild pain or moderate pain (Patient not taking: No sig reported) 30 tablet 0    Pyridoxine HCl (VITAMIN B-6) 500 MG tablet Take 500 mg by mouth daily (Patient not taking: Reported on 8/17/2022)       No current facility-administered medications for this visit  Blood pressure 120/72, pulse 74, resp  rate 18, height 5' 4" (1 626 m), weight 87 1 kg (192 lb), SpO2 100 %, currently breastfeeding  PHYSICAL EXAM:      General Appearance:   Alert, cooperative, no distress, appears stated age    HEENT:   Normocephalic, atraumatic, anicteric      Neck:  Supple, symmetrical, trachea midline, no adenopathy;    thyroid: no enlargement/tenderness/nodules; no carotid  bruit or JVD    Lungs:   Clear to auscultation bilaterally; no rales, rhonchi or wheezing; respirations unlabored    Heart[de-identified]   S1 and S2 normal; regular rate and rhythm; no murmur, rub, or gallop  Abdomen:   Soft, non-tender, non-distended; normal bowel sounds; no masses, no organomegaly    Extremities: No edema, erythema, wounds, rashes   Rectal:   Deferred                      Lab Results   Component Value Date    WBC 8 49 07/01/2022    HGB 14 4 07/01/2022    HCT 42 6 07/01/2022    MCV 87 07/01/2022     07/01/2022     Lab Results   Component Value Date    CALCIUM 9 0 07/01/2022    K 3 7 07/01/2022    CO2 21 07/01/2022     07/01/2022    BUN 9 07/01/2022    CREATININE 0 67 07/01/2022     Lab Results   Component Value Date    ALT 14 07/01/2022    AST 18 07/01/2022    ALKPHOS 177 (H) 07/01/2022     No results found for: INR, PROTIME    MRI abdomen w wo contrast    Result Date: 9/20/2022  Impression: 1  A 1 cm segment 5 hemangioma corresponds to the hyperechoic lesion seen on prior ultrasound  No suspicious liver lesions  2   Gallbladder polyps seen on prior ultrasound are not well seen on the current study   Workstation performed: [id number]       ASSESSMENT & PLAN:    Gallbladder polyp  Benign appearing gallbladder polyps seen on ultrasound from June     -follow-up on ultrasound to follow-up in December    Gastroesophageal reflux disease  Has history of GERD symptomatology which was exacerbated during pregnancy, still using Pepcid daily but with some improvement in symptoms    Reasonable to rule out underlying Barretts, she is already being planned for colonoscopy     -plan for EGD at the same time as colonoscopy     -procedure was explained in detail to the patient at this time, including associated risks and benefits     -continue Pepcid daily for now    -advised patient regarding dietary and lifestyle modification strategies for the mitigation of GERD    Elevated alkaline phosphatase level  Alkaline phosphatase level noted in the 170s July 1st, the day before she delivered with her recent pregnancy; nonspecific and may be related to pregnancy associated cholestasis    - will check liver function tests with other lab work (CBC) prior to EGD/colonoscopy, can consider further workup if alkaline phosphatase remains elevated but suspect this has normalized    Hemangioma of intra-abdominal structure  Asymptomatic hemangioma in segment 5 measuring only 1 cm on recent MRI; should not require further surveillance at this time    - reviewed MRI findings with patient and reassured her, discussed about pathology and management of liver hemangiomas    Screening for colon cancer  Patient is 41years old and has never had colonoscopy; no elevated risk factors, no alarm symptoms at this time, rule out adenomatous polyps or malignancy     -plan for colonoscopy at the same time as EGD; again procedures were explained in detail to the patient at this time     -prescription and instructions provided for colonoscopy prep

## 2022-09-29 NOTE — TELEPHONE ENCOUNTER
Scheduled date of EGD/colonoscopy (as of today): 12/6/22  Physician performing EGD/colonoscopy: Mary Lozano  Location of EGD/colonoscopy[facility]  Desired bowel prep reviewed with patient: Clenpiq  Instructions reviewed with patient by:Baylee STEVENSON  Clearances:  None

## 2022-09-30 ENCOUNTER — TELEPHONE (OUTPATIENT)
Dept: POSTPARTUM | Facility: CLINIC | Age: 47
End: 2022-09-30

## 2022-09-30 NOTE — TELEPHONE ENCOUNTER
Ramon Flynn called - she is scheduled for a Colonoscopy in December & asking if this is ok to do while breastfeeding  She will not be able to eat for 12hrs prior & will be under Anesthesia      Ryan major

## 2022-09-30 NOTE — TELEPHONE ENCOUNTER
Saurabh Suresh is scheduled for a colonoscopy in December, she wants to know if she can still breast feed  She will be on a clear liquid diet 12 hours prior,  taking the prep Clenpiq and receiving anesthesia  Reviewed with Dr Ling Cm and advised she may still breastfeed at this time     Pt verbalizes understanding has no further questions at this time

## 2022-10-06 ENCOUNTER — TELEPHONE (OUTPATIENT)
Dept: GASTROENTEROLOGY | Facility: AMBULARY SURGERY CENTER | Age: 47
End: 2022-10-06

## 2022-10-06 NOTE — TELEPHONE ENCOUNTER
Left message for patient to call back to reschedule colonoscopy/egd on 12/6/2022 with Dr Diane Jordan at [facility]  Dr Elizabeth Sheffield will not be in office the day the procedure was schedule for     Left call back number of [phone]

## 2022-10-08 DIAGNOSIS — O10.919 CHRONIC HYPERTENSION AFFECTING PREGNANCY: ICD-10-CM

## 2022-10-09 RX ORDER — LABETALOL 200 MG/1
TABLET, FILM COATED ORAL
Qty: 60 TABLET | Refills: 1 | Status: SHIPPED | OUTPATIENT
Start: 2022-10-09

## 2022-10-30 LAB
ALBUMIN SERPL-MCNC: 4.3 G/DL (ref 3.6–5.1)
ALBUMIN/GLOB SERPL: 1.7 (CALC) (ref 1–2.5)
ALP SERPL-CCNC: 99 U/L (ref 31–125)
ALT SERPL-CCNC: 16 U/L (ref 6–29)
AST SERPL-CCNC: 19 U/L (ref 10–35)
BASOPHILS # BLD AUTO: 51 CELLS/UL (ref 0–200)
BASOPHILS NFR BLD AUTO: 0.9 %
BILIRUB SERPL-MCNC: 0.7 MG/DL (ref 0.2–1.2)
BUN SERPL-MCNC: 17 MG/DL (ref 7–25)
BUN/CREAT SERPL: NORMAL (CALC) (ref 6–22)
CALCIUM SERPL-MCNC: 9.7 MG/DL (ref 8.6–10.2)
CHLORIDE SERPL-SCNC: 105 MMOL/L (ref 98–110)
CO2 SERPL-SCNC: 29 MMOL/L (ref 20–32)
CREAT SERPL-MCNC: 0.84 MG/DL (ref 0.5–0.99)
EOSINOPHIL # BLD AUTO: 137 CELLS/UL (ref 15–500)
EOSINOPHIL NFR BLD AUTO: 2.4 %
ERYTHROCYTE [DISTWIDTH] IN BLOOD BY AUTOMATED COUNT: 12.4 % (ref 11–15)
GFR/BSA.PRED SERPLBLD CYS-BASED-ARV: 87 ML/MIN/1.73M2
GLOBULIN SER CALC-MCNC: 2.5 G/DL (CALC) (ref 1.9–3.7)
GLUCOSE SERPL-MCNC: 83 MG/DL (ref 65–99)
HCT VFR BLD AUTO: 42.5 % (ref 35–45)
HGB BLD-MCNC: 14.4 G/DL (ref 11.7–15.5)
LYMPHOCYTES # BLD AUTO: 1761 CELLS/UL (ref 850–3900)
LYMPHOCYTES NFR BLD AUTO: 30.9 %
MCH RBC QN AUTO: 28.9 PG (ref 27–33)
MCHC RBC AUTO-ENTMCNC: 33.9 G/DL (ref 32–36)
MCV RBC AUTO: 85.2 FL (ref 80–100)
MONOCYTES # BLD AUTO: 638 CELLS/UL (ref 200–950)
MONOCYTES NFR BLD AUTO: 11.2 %
NEUTROPHILS # BLD AUTO: 3112 CELLS/UL (ref 1500–7800)
NEUTROPHILS NFR BLD AUTO: 54.6 %
PLATELET # BLD AUTO: 219 THOUSAND/UL (ref 140–400)
PMV BLD REES-ECKER: 10.6 FL (ref 7.5–12.5)
POTASSIUM SERPL-SCNC: 3.9 MMOL/L (ref 3.5–5.3)
PROT SERPL-MCNC: 6.8 G/DL (ref 6.1–8.1)
RBC # BLD AUTO: 4.99 MILLION/UL (ref 3.8–5.1)
SODIUM SERPL-SCNC: 140 MMOL/L (ref 135–146)
WBC # BLD AUTO: 5.7 THOUSAND/UL (ref 3.8–10.8)

## 2022-11-04 ENCOUNTER — NURSE TRIAGE (OUTPATIENT)
Dept: OTHER | Facility: OTHER | Age: 47
End: 2022-11-04

## 2022-11-04 NOTE — TELEPHONE ENCOUNTER
Regarding: Colonoscopy 11/14/22 prep question  ----- Message from Sujit Lynn sent at 11/4/2022  9:48 AM EDT -----  "I am having a colonoscopy on 11/14 and I have some prep questions and breast feeding "

## 2022-11-04 NOTE — TELEPHONE ENCOUNTER
Reason for Disposition  • Information only question and nurse able to answer    Answer Assessment - Initial Assessment Questions  1  REASON FOR CALL or QUESTION: "What is your reason for calling today?" or "How can I best help you?" or "What question do you have that I can help answer?"      Patient called [facility], stated if she could continue eating oatmeal with flax seeds prior the colonoscopy      Protocols used: INFORMATION ONLY CALL - NO TRIAGE-ADULT-[place]

## 2022-11-04 NOTE — TELEPHONE ENCOUNTER
Patient was advised to stop eating oatmeal with seeds, may continue having oatmeal milk and flaxseeds supplement tablets  Patient agreeable with advice

## 2022-11-11 ENCOUNTER — TELEPHONE (OUTPATIENT)
Dept: GASTROENTEROLOGY | Facility: CLINIC | Age: 47
End: 2022-11-11

## 2022-11-11 NOTE — TELEPHONE ENCOUNTER
Received call from patient  She is canceling at this time due to breast feeding concerns  Patient will call back when she is ready to schedule

## 2022-11-28 PROBLEM — Z12.11 SCREENING FOR COLON CANCER: Status: RESOLVED | Noted: 2021-08-26 | Resolved: 2022-11-28

## 2022-12-03 DIAGNOSIS — E03.8 SUBCLINICAL HYPOTHYROIDISM: ICD-10-CM

## 2022-12-05 ENCOUNTER — ANNUAL EXAM (OUTPATIENT)
Dept: OBGYN CLINIC | Facility: CLINIC | Age: 47
End: 2022-12-05

## 2022-12-05 VITALS
SYSTOLIC BLOOD PRESSURE: 122 MMHG | DIASTOLIC BLOOD PRESSURE: 82 MMHG | WEIGHT: 192.8 LBS | BODY MASS INDEX: 32.91 KG/M2 | HEIGHT: 64 IN

## 2022-12-05 DIAGNOSIS — Z01.419 ENCOUNTER FOR GYNECOLOGICAL EXAMINATION (GENERAL) (ROUTINE) WITHOUT ABNORMAL FINDINGS: Primary | ICD-10-CM

## 2022-12-05 NOTE — ASSESSMENT & PLAN NOTE
The current ASCCP guidelines were reviewed  Patient's last pap was 12/14/21 - WNL (-) HRHPV type 16/18 neg and therefore, a pap with HPV cotesting is not indicated at this time   I emphasized the importance of an annual pelvic and breast exam  Patient ok to defer pap today

## 2022-12-05 NOTE — PROGRESS NOTES
Assessment/Plan   Diagnoses and all orders for this visit:    Encounter for gynecological examination (general) (routine) without abnormal findings    The current ASCCP guidelines were reviewed  Patient's last pap was 12/14/21 - WNL (-) HRHPV type 16/18 neg and therefore, a pap with HPV cotesting is not indicated at this time  I emphasized the importance of an annual pelvic and breast exam  Patient ok to defer pap today  Discussion  I have discussed the importance of monthly self-breast exams, exercise and healthy diet as well as adequate intake of calcium and vitamin D  Encourage MVI q day and r/rigo importance of folic acid; Encourage 30-40 min weight bearing exercise most days of week  Encourage safe sexual practices; STI testing - declines  Contraception - declines and open to pregnancy if it happens  A yearly mammogram is recommended for breast cancer screening starting at age 36 - reviewed restarting mammogram screening 6 months after d/c breastfeeding  In addition, colon cancer screening with a colonoscopy is recommended starting at age 36-53 and reviewed benefits - colorectal screening ordered 9/29/22  All questions have been answered to her satisfaction  RTO for APE or sooner if needed    Subjective     HPI   Ariadna Manning is a 41 y o  female who presents for annual well woman exam  Delivered baby 7/2022 - exclusively breast feeding and baby is doing really well  She goes back to work next Friday  LMP - no period since prior to pregnancy - a little spotting back in September - spotted for a week, then nothing for a week, then spotted again - none since; No vulvar itch/burn; No vaginal itch/burn; No abn discharge or odor;  No urinary sx - burning/pain/frequency/hematuria  (+) SBEs - no breast masses, asymmetry, nipple discharge or bleeding, changes in skin of breast, or breast tenderness bilaterally  No abd/pelvic pain or HAs;   Pt is sexually active in a mutually monog/ sexual relationship; No issues with intercourse; She declines sti/hiv/hep testing; Feels safe at home  Current contraception: none - open to pregnancy; history of infertility  (+) PCP for routine Bw/care - manages her [facility];    Last Pap - 21 - WNL (-) HRHPV type 16/18 neg  History of abnormal Pap smear: yes  Last mammo - 21 - Birads 1 negative; Type C density  History of abnormal mammogram: yes and WNL on follow-up  Last colonoscopy - none - ordered 22    Review of Systems   Constitutional: Negative for activity change, fatigue, fever and unexpected weight change  HENT: Negative for congestion, dental problem, sinus pressure and sinus pain  Eyes: Negative for visual disturbance  Respiratory: Negative for cough, shortness of breath and wheezing  Cardiovascular: Negative for chest pain and leg swelling  Gastrointestinal: Negative for abdominal distention, abdominal pain, blood in stool, constipation, diarrhea, nausea and vomiting  Endocrine: Negative for polydipsia  Genitourinary: Negative for difficulty urinating, dyspareunia, dysuria, frequency, hematuria, menstrual problem, pelvic pain, urgency, vaginal bleeding, vaginal discharge and vaginal pain  Musculoskeletal: Negative for arthralgias and back pain  Allergic/Immunologic: Negative for environmental allergies  Neurological: Negative for dizziness, seizures and headaches  Psychiatric/Behavioral: Negative for dysphoric mood and sleep disturbance  The patient is nervous/anxious (history of anxiety but is able to keep her feelings under control at this time)          The following portions of the patient's history were reviewed and updated as appropriate: allergies, current medications, past family history, past medical history, past social history, past surgical history and problem list          OB History        1    Para   1    Term   1       0    AB   0    Living   1       SAB   0    IAB   0    Ectopic   0    Multiple 0    Live Births   1           Obstetric Comments   :   38w IOL CHTN  Pregnant through IUI             Past Medical History:   Diagnosis Date   • Abnormal Pap smear of cervix     in 30's  • Anxiety    • Environmental allergies    • Female infertility    • Genital warts 2005    Has not returned   • GERD (gastroesophageal reflux disease)    • History of infertility    • Hypertension    • PONV (postoperative nausea and vomiting)    • Sinus infection     chronic   • Subclinical hypothyroidism    • Varicella        Past Surgical History:   Procedure Laterality Date   • CONDYLOMA EXCISION/FULGURATION     • HAND SURGERY Left     ganglion cyst   • [place] LARYNGOSCOPY,DIRECT,SCOPE,INJ CORDS N/A 04/10/2017    Procedure: MICROLARYNGOSCOPY BILATERAL VOCAL FOLD INJECTION (DECADRON);   Surgeon: Neville Knight MD;  [facility];  Service: ENT   • REFRACTIVE SURGERY     • WISDOM TOOTH EXTRACTION         Family History   Problem Relation Age of Onset   • Breast cancer Mother 48   • Hypertension Father    • Diabetes type I Brother    • Diabetes Maternal Grandmother    • Diabetes Paternal Grandfather    • Diabetes Brother        Social History     Socioeconomic History   • Marital status: /Civil Union     Spouse name: Not on file   • Number of children: Not on file   • Years of education: Not on file   • Highest education level: Not on file   Occupational History   • Not on file   Tobacco Use   • Smoking status: Never   • Smokeless tobacco: Never   Vaping Use   • Vaping Use: Never used   Substance and Sexual Activity   • Alcohol use: Not Currently     Comment: occ   • Drug use: No   • Sexual activity: Yes     Partners: Male     Birth control/protection: None   Other Topics Concern   • Not on file   Social History Narrative   • Not on file     Social Determinants of Health     Financial Resource Strain: Not on file   Food Insecurity: Not on file   Transportation Needs: Not on file   Physical Activity: Not on file   Stress: Not on file   Social Connections: Not on file   Intimate Partner Violence: Not on file   Housing Stability: Not on file         Current Outpatient Medications:   •  Ascorbic Acid (VITAMIN C PO), Take by mouth, Disp: , Rfl:   •  calcium carbonate (OS-ROSA) 600 MG tablet, Take 600 mg by mouth daily, Disp: , Rfl:   •  Cetirizine HCl (ZYRTEC ALLERGY PO), Take by mouth, Disp: , Rfl:   •  docusate sodium (COLACE) 100 mg capsule, Take 100 mg by mouth 2 (two) times a day, Disp: , Rfl:   •  famotidine (PEPCID) 20 mg tablet, , Disp: , Rfl:   •  labetalol (NORMODYNE) 200 mg tablet, TAKE 1 TABLET BY MOUTH EVERY 12 HOURS, Disp: 60 tablet, Rfl: 1  •  levothyroxine 25 mcg tablet, TAKE 1 TABLET BY MOUTH EVERY DAY, Disp: 30 tablet, Rfl: 2  •  Prenatal MV-Min-Fe Fum-FA-DHA (PRENATAL 1 PO), Take by mouth, Disp: , Rfl:   •  valACYclovir (VALTREX) 500 mg tablet, Take 500 mg by mouth daily, Disp: , Rfl:   •  VITAMIN D PO, Take by mouth, Disp: , Rfl:     Allergies   Allergen Reactions   • Other Sneezing     Seasonal         Objective   Vitals:    12/05/22 0909   BP: 122/82   BP Location: Left arm   Patient Position: Sitting   Cuff Size: Standard   Weight: 87 5 kg (192 lb 12 8 oz)   Height: 5' 4" (1 626 m)     Physical Exam  Vitals reviewed  Constitutional:       General: She is awake  She is not in acute distress  Appearance: Normal appearance  She is well-developed and well-groomed  She is not ill-appearing, toxic-appearing or diaphoretic  HENT:      Head: Normocephalic and atraumatic  Eyes:      Conjunctiva/sclera: Conjunctivae normal    Neck:      Thyroid: No thyroid mass, thyromegaly or thyroid tenderness  Cardiovascular:      Rate and Rhythm: Normal rate and regular rhythm  Heart sounds: Normal heart sounds  No murmur heard  Pulmonary:      Effort: Pulmonary effort is normal  No tachypnea, bradypnea or respiratory distress  Breath sounds: Normal breath sounds   No stridor or decreased air movement  No wheezing  Chest:   Breasts:     Breasts are symmetrical       Right: Normal  No swelling, bleeding, inverted nipple, mass, nipple discharge, skin change or tenderness  Left: Normal  No swelling, bleeding, inverted nipple, mass, nipple discharge, skin change or tenderness  Abdominal:      General: There is no distension  Palpations: Abdomen is soft  There is no hepatomegaly, splenomegaly or mass  Tenderness: There is no abdominal tenderness  Hernia: No hernia is present  There is no hernia in the left inguinal area or right inguinal area  Genitourinary:     General: Normal vulva  Exam position: Supine  Pubic Area: No rash or pubic lice  Labia:         Right: No rash, tenderness, lesion or injury  Left: No rash, tenderness, lesion or injury  Urethra: No prolapse, urethral pain, urethral swelling or urethral lesion  Vagina: Normal  No signs of injury and foreign body  No vaginal discharge, erythema, tenderness, bleeding, lesions or prolapsed vaginal walls  Cervix: No cervical motion tenderness, discharge, friability, lesion, erythema or cervical bleeding  Uterus: Not deviated, not enlarged, not fixed, not tender and no uterine prolapse  Adnexa:         Right: No mass, tenderness or fullness  Left: No mass, tenderness or fullness  Rectum: Normal  Guaiac result negative  No mass, tenderness, anal fissure, external hemorrhoid or internal hemorrhoid  Normal anal tone  Lymphadenopathy:      Cervical: No cervical adenopathy  Upper Body:      Right upper body: No supraclavicular or axillary adenopathy  Left upper body: No supraclavicular or axillary adenopathy  Lower Body: No right inguinal adenopathy  No left inguinal adenopathy  Skin:     General: Skin is warm and dry  Neurological:      Mental Status: She is alert and oriented to person, place, and time     Psychiatric:         Mood and Affect: Mood and affect normal          Speech: Speech normal          Behavior: Behavior normal  Behavior is cooperative  Thought Content:  Thought content normal          Judgment: Judgment normal

## 2022-12-06 RX ORDER — LEVOTHYROXINE SODIUM 0.03 MG/1
TABLET ORAL
Qty: 30 TABLET | Refills: 2 | Status: SHIPPED | OUTPATIENT
Start: 2022-12-06

## 2022-12-14 ENCOUNTER — HOSPITAL ENCOUNTER (OUTPATIENT)
Dept: ULTRASOUND IMAGING | Facility: HOSPITAL | Age: 47
Discharge: HOME | End: 2022-12-14

## 2022-12-14 DIAGNOSIS — K82.4 GALLBLADDER POLYP: ICD-10-CM

## 2022-12-14 DIAGNOSIS — K76.9 HEPATIC LESION: ICD-10-CM

## 2023-03-02 DIAGNOSIS — K76.89 HEPATIC CYST: Primary | ICD-10-CM

## 2023-03-09 ENCOUNTER — TELEPHONE (OUTPATIENT)
Dept: GASTROENTEROLOGY | Facility: AMBULARY SURGERY CENTER | Age: 48
End: 2023-03-09

## 2023-03-09 NOTE — TELEPHONE ENCOUNTER
----- Message from Chad Sosa sent at 3/9/2023  9:18 AM EST -----    ----- Message -----  From: Tony Goncalves MD  Sent: 3/2/2023   9:19 AM EST  To: [facility]    Inform the patient that the MRI is ordered to be done in 3 months  Follow-up after the MRI

## 2023-08-05 DIAGNOSIS — E03.8 SUBCLINICAL HYPOTHYROIDISM: ICD-10-CM

## 2023-08-07 RX ORDER — LEVOTHYROXINE SODIUM 0.03 MG/1
TABLET ORAL
Qty: 30 TABLET | Refills: 2 | OUTPATIENT
Start: 2023-08-07

## 2023-12-06 NOTE — PROGRESS NOTES
Assessment/Plan   Diagnoses and all orders for this visit:    Encounter for gynecological examination (general) (routine) without abnormal findings  -     Liquid-based pap, screening    The current ASCCP guidelines were reviewed. Patient's last pap was 12/14/21 - WNL (-) HRHPV type 16/18 neg and therefore, a pap with HPV cotesting is not indicated at this time. I emphasized the importance of an annual pelvic and breast exam. Patient opts to have a pap done today. Discussion  I have discussed the importance of monthly self-breast exams, exercise and healthy diet as well as adequate intake of calcium and vitamin D. Encourage MVI q day and r/rigo importance of folic acid; Encourage 30-40 min weight bearing exercise most days of week  Encourage safe sexual practices; STI testing - declines  Contraception - none - open to pregnancy; history of infertility  A yearly mammogram is recommended for breast cancer screening starting at age 36 - since still breastfeeding, recommend routine mammogram screening 6 months after she stops breastfeeding; In addition, colon cancer screening with a colonoscopy is recommended starting at age 42-54 and reviewed benefits. She attempted to have done twice now but needed to cancel. She plans to wait until she is done breastfeeding. All questions have been answered to her satisfaction  RTO for APE or sooner if needed    Subjective     HPI   Kevin Nieto is a 57 y.o. female who presents for annual well woman exam. Still breastfeeding 15 month old - plans to try to get to 2 years. Menarche - 14; LMP - 12/1/23; Periods are reg q month and last 7 days; Heavy flow first 3-4 days, then taper off; At the heaviest, changes a thick pad with wings q couple hours; No intermenstrual bleeding or spotting; Cramps are tolerable without medication. No vulvar itch/burn; No vaginal itch/burn; No abn discharge or odor;  No urinary sx - burning/pain/frequency/hematuria  (+) SBEs - no breast masses, asymmetry, nipple discharge or bleeding, changes in skin of breast, or breast tenderness bilaterally  No abd/pelvic pain or HAs; No menopausal symptoms: No hot flashes/night sweats, problems with intercourse, vaginal dryness; sleeping ok - seeing [facility] today for carpel tunnel;   Pt is sexually active in a mutually monog/ sexual relationship; No issues with intercourse; She declines sti/hiv/hep testing; Feels safe at home  Current contraception: none - open to pregnancy; history of infertility   (+) PCP for routine Bw/care; Last Pap - 12/14/21 - WNL (-) HRHPV type 16/18 neg  History of abnormal Pap smear: yes  Last mammo - 9/30/21 - Birads 1 negative; Type C density  History of abnormal mammogram: yes and WNL on follow-up  Last colonoscopy - none    Review of Systems   Constitutional:  Negative for activity change, fatigue, fever and unexpected weight change. HENT:  Negative for congestion, dental problem, sinus pressure and sinus pain. Eyes:  Negative for visual disturbance. Respiratory:  Negative for cough, shortness of breath and wheezing. Cardiovascular:  Negative for chest pain and leg swelling. Gastrointestinal:  Negative for abdominal distention, abdominal pain, blood in stool, constipation, diarrhea, nausea and vomiting. Endocrine: Negative for polydipsia. Genitourinary:  Negative for difficulty urinating, dyspareunia, dysuria, frequency, hematuria, menstrual problem, pelvic pain, urgency, vaginal bleeding, vaginal discharge and vaginal pain. Musculoskeletal:  Negative for arthralgias and back pain. Allergic/Immunologic: Negative for environmental allergies. Neurological:  Negative for dizziness, seizures and headaches. Psychiatric/Behavioral:  Negative for dysphoric mood and sleep disturbance. The patient is not nervous/anxious.         The following portions of the patient's history were reviewed and updated as appropriate: allergies, current medications, past family history, past medical history, past social history, past surgical history, and problem list.         OB History          1    Para   1    Term   1       0    AB   0    Living   1         SAB   0    IAB   0    Ectopic   0    Multiple   0    Live Births   1           Obstetric Comments   :   38w IOL CHTN  Pregnant through IUI               Past Medical History:   Diagnosis Date    Abnormal Pap smear of cervix     in 30s. Anxiety     Environmental allergies     Female infertility 2018    Genital warts 2005    Has not returned    GERD (gastroesophageal reflux disease)     History of infertility     Hypertension     PONV (postoperative nausea and vomiting)     Sinus infection     chronic    Subclinical hypothyroidism     Varicella 1985       Past Surgical History:   Procedure Laterality Date    CONDYLOMA EXCISION/FULGURATION      HAND SURGERY Left     ganglion cyst     [place] VOCAL CORD THER W/MICRO/TELESCOPE N/A 04/10/2017    Procedure: MICROLARYNGOSCOPY BILATERAL VOCAL FOLD INJECTION (DECADRON);   Surgeon: Racheal Malhotra MD;  [facility];  Service: ENT    REFRACTIVE SURGERY      WISDOM TOOTH EXTRACTION         Family History   Problem Relation Age of Onset    Breast cancer Mother 48    Hypertension Father     Diabetes type I Brother     Diabetes Maternal Grandmother     Diabetes Paternal Grandfather     Diabetes Brother        Social History     Socioeconomic History    Marital status: /Civil Union     Spouse name: Not on file    Number of children: Not on file    Years of education: Not on file    Highest education level: Not on file   Occupational History    Not on file   Tobacco Use    Smoking status: Never    Smokeless tobacco: Never   Vaping Use    Vaping Use: Never used   Substance and Sexual Activity    Alcohol use: Not Currently     Comment: occ    Drug use: No    Sexual activity: Yes     Partners: Male     Birth control/protection: None   Other Topics Concern    Not on file   Social History Narrative    Not on file     Social Determinants of Health     Financial Resource Strain: Not on file   Food Insecurity: Not on file   Transportation Needs: Not on file   Physical Activity: Not on file   Stress: Not on file   Social Connections: Not on file   Intimate Partner Violence: Not on file   Housing Stability: Not on file         Current Outpatient Medications:     Ascorbic Acid (VITAMIN C PO), Take by mouth, Disp: , Rfl:     calcium carbonate (OS-ROSA) 600 MG tablet, Take 600 mg by mouth daily, Disp: , Rfl:     Cetirizine HCl (ZYRTEC ALLERGY PO), Take by mouth, Disp: , Rfl:     famotidine (PEPCID) 20 mg tablet, , Disp: , Rfl:     labetalol (NORMODYNE) 200 mg tablet, TAKE 1 TABLET BY MOUTH EVERY 12 HOURS, Disp: 60 tablet, Rfl: 1    levothyroxine 25 mcg tablet, TAKE 1 TABLET BY MOUTH EVERY DAY, Disp: 30 tablet, Rfl: 2    Prenatal MV-Min-Fe Fum-FA-DHA (PRENATAL 1 PO), Take by mouth, Disp: , Rfl:     valACYclovir (VALTREX) 500 mg tablet, Take 500 mg by mouth daily, Disp: , Rfl:     VITAMIN D PO, Take by mouth, Disp: , Rfl:     docusate sodium (COLACE) 100 mg capsule, Take 100 mg by mouth 2 (two) times a day, Disp: , Rfl:     Allergies   Allergen Reactions    Other Sneezing     Seasonal         Objective   Vitals:    12/07/23 0903   BP: 118/82   BP Location: Left arm   Patient Position: Sitting   Cuff Size: Standard   Weight: 82.1 kg (181 lb)   Height: 5' 4" (1.626 m)     Physical Exam  Vitals reviewed. Constitutional:       General: She is awake. She is not in acute distress. Appearance: Normal appearance. She is well-developed and well-groomed. She is not ill-appearing, toxic-appearing or diaphoretic. HENT:      Head: Normocephalic and atraumatic. Eyes:      Conjunctiva/sclera: Conjunctivae normal.   Neck:      Thyroid: No thyroid mass, thyromegaly or thyroid tenderness. Cardiovascular:      Rate and Rhythm: Normal rate and regular rhythm.       Heart sounds: Normal heart sounds. No murmur heard. Pulmonary:      Effort: Pulmonary effort is normal. No tachypnea, bradypnea or respiratory distress. Breath sounds: Normal breath sounds. No stridor or decreased air movement. No wheezing. Chest:   Breasts:     Breasts are symmetrical.      Right: Normal. No swelling, bleeding, inverted nipple, mass, nipple discharge, skin change or tenderness. Left: Normal. No swelling, bleeding, inverted nipple, mass, nipple discharge, skin change or tenderness. Abdominal:      General: There is no distension. Palpations: Abdomen is soft. There is no hepatomegaly, splenomegaly or mass. Tenderness: There is no abdominal tenderness. Hernia: No hernia is present. There is no hernia in the left inguinal area or right inguinal area. Genitourinary:     General: Normal vulva. Exam position: Supine. Pubic Area: No rash or pubic lice. Labia:         Right: No rash, tenderness, lesion or injury. Left: No rash, tenderness, lesion or injury. Urethra: No prolapse, urethral pain, urethral swelling or urethral lesion. Vagina: Normal. No signs of injury and foreign body. No vaginal discharge, erythema, tenderness, bleeding, lesions or prolapsed vaginal walls. Cervix: No cervical motion tenderness, discharge, friability, lesion, erythema or cervical bleeding. Uterus: Not deviated, not enlarged, not fixed, not tender and no uterine prolapse. Adnexa:         Right: No mass, tenderness or fullness. Left: No mass, tenderness or fullness. Rectum: Normal. Guaiac result negative. No mass, tenderness, anal fissure, external hemorrhoid or internal hemorrhoid. Normal anal tone. Lymphadenopathy:      Cervical: No cervical adenopathy. Upper Body:      Right upper body: No supraclavicular or axillary adenopathy. Left upper body: No supraclavicular or axillary adenopathy.       Lower Body: No right inguinal adenopathy. No left inguinal adenopathy. Skin:     General: Skin is warm and dry. Neurological:      Mental Status: She is alert and oriented to person, place, and time. Psychiatric:         Mood and Affect: Mood and affect normal.         Speech: Speech normal.         Behavior: Behavior normal. Behavior is cooperative. Thought Content:  Thought content normal.         Judgment: Judgment normal.

## 2023-12-07 ENCOUNTER — ANNUAL EXAM (OUTPATIENT)
Dept: OBGYN CLINIC | Facility: CLINIC | Age: 48
End: 2023-12-07
Payer: COMMERCIAL

## 2023-12-07 VITALS
HEIGHT: 64 IN | WEIGHT: 181 LBS | DIASTOLIC BLOOD PRESSURE: 82 MMHG | SYSTOLIC BLOOD PRESSURE: 118 MMHG | BODY MASS INDEX: 30.9 KG/M2

## 2023-12-07 DIAGNOSIS — Z01.419 ENCOUNTER FOR GYNECOLOGICAL EXAMINATION (GENERAL) (ROUTINE) WITHOUT ABNORMAL FINDINGS: Primary | ICD-10-CM

## 2023-12-07 PROCEDURE — G0145 SCR C/V CYTO,THINLAYER,RESCR: HCPCS | Performed by: PHYSICIAN ASSISTANT

## 2023-12-07 PROCEDURE — S0612 ANNUAL GYNECOLOGICAL EXAMINA: HCPCS | Performed by: PHYSICIAN ASSISTANT

## 2023-12-14 LAB
LAB AP GYN PRIMARY INTERPRETATION: NORMAL
LAB AP LMP: NORMAL
Lab: NORMAL

## 2024-01-30 NOTE — LETTER
April 27, 2022     [facility], [address] [id number]    Patient: Edgar Mason   YOB: 1975   Date of Visit: 4/27/2022       Dear Dr Kenan Ashby:    Thank you for referring Sera Perez to me for evaluation  Below are my notes for this consultation  If you have questions, please do not hesitate to call me  I look forward to following your patient along with you  Sincerely,        Magaly Lugo MD        CC: No Recipients  Magaly Lugo MD  4/27/2022  4:28 PM  Sign when Signing Visit  [address] [place] [address] Road: Ms Hong Navarrete was seen today at 29w2d for fetal growth and followup missed anatomy ultrasound  See ultrasound report under "OB Procedures" tab    Please don't hesitate to contact our office with any concerns or questions   -Magaly Lugo MD Good morning! Pt notified and will like if you would send order for BP cuff.

## 2024-02-21 PROBLEM — Z01.419 ENCOUNTER FOR GYNECOLOGICAL EXAMINATION (GENERAL) (ROUTINE) WITHOUT ABNORMAL FINDINGS: Status: RESOLVED | Noted: 2022-12-05 | Resolved: 2024-02-21

## 2024-05-30 ENCOUNTER — TELEPHONE (OUTPATIENT)
Age: 49
End: 2024-05-30

## 2024-05-30 NOTE — TELEPHONE ENCOUNTER
Patient recently prescribed oxyCODONE and is looking for recommendations on an alternative she can take that will be safe for her baby as she is currently breastfeeding. Contact number on file.

## 2024-05-31 NOTE — TELEPHONE ENCOUNTER
Patient called for advice on which rx to take prior to her Carpal tunnel surgery scheduled for 6/3/24. Her surgeon gave her Oxycodone but she does not want to take it. She is breastfeeding.  Advised check with her pediatrician to see what she can take while BF and then call surgeon's office back with that suggestion.

## (undated) DEVICE — NEURO PATTIES 1/2 X 1/2

## (undated) DEVICE — TELFA NON-ADHERENT ABSORBENT DRESSING: Brand: TELFA

## (undated) DEVICE — SCD SEQUENTIAL COMPRESSION COMFORT SLEEVE MEDIUM KNEE LENGTH: Brand: KENDALL SCD

## (undated) DEVICE — TUBING SUCTION 5MM X 12 FT

## (undated) DEVICE — STRL UNIVERSAL OUTPATIENT PACK: Brand: CARDINAL HEALTH

## (undated) DEVICE — NEEDLE 18 G X 1 1/2 SAFETY

## (undated) DEVICE — NEEDLE BLUNT 18 G X 1 1/2IN

## (undated) DEVICE — GLOVE SRG BIOGEL 7